# Patient Record
Sex: MALE | Race: BLACK OR AFRICAN AMERICAN | NOT HISPANIC OR LATINO | Employment: FULL TIME | URBAN - METROPOLITAN AREA
[De-identification: names, ages, dates, MRNs, and addresses within clinical notes are randomized per-mention and may not be internally consistent; named-entity substitution may affect disease eponyms.]

---

## 2020-07-18 ENCOUNTER — APPOINTMENT (OUTPATIENT)
Dept: GENERAL RADIOLOGY | Facility: CLINIC | Age: 25
End: 2020-07-18
Attending: EMERGENCY MEDICINE
Payer: COMMERCIAL

## 2020-07-18 ENCOUNTER — HOSPITAL ENCOUNTER (EMERGENCY)
Facility: CLINIC | Age: 25
Discharge: HOME | End: 2020-07-18
Attending: EMERGENCY MEDICINE | Admitting: EMERGENCY MEDICINE
Payer: COMMERCIAL

## 2020-07-18 VITALS
OXYGEN SATURATION: 99 % | RESPIRATION RATE: 21 BRPM | SYSTOLIC BLOOD PRESSURE: 111 MMHG | DIASTOLIC BLOOD PRESSURE: 61 MMHG | TEMPERATURE: 97 F | HEART RATE: 78 BPM | WEIGHT: 119 LBS

## 2020-07-18 DIAGNOSIS — G40.909 RECURRENT SEIZURES (H): ICD-10-CM

## 2020-07-18 LAB
ALBUMIN SERPL-MCNC: 4.3 G/DL (ref 3.4–5)
ALP SERPL-CCNC: 57 U/L (ref 40–150)
ALT SERPL W P-5'-P-CCNC: 25 U/L (ref 0–70)
ANION GAP SERPL CALCULATED.3IONS-SCNC: 8 MMOL/L (ref 3–14)
AST SERPL W P-5'-P-CCNC: 17 U/L (ref 0–45)
BASOPHILS # BLD AUTO: 0 10E9/L (ref 0–0.2)
BASOPHILS NFR BLD AUTO: 0.1 %
BILIRUB SERPL-MCNC: 0.6 MG/DL (ref 0.2–1.3)
BUN SERPL-MCNC: 5 MG/DL (ref 7–30)
CALCIUM SERPL-MCNC: 9.1 MG/DL (ref 8.5–10.1)
CHLORIDE SERPL-SCNC: 106 MMOL/L (ref 94–109)
CO2 SERPL-SCNC: 23 MMOL/L (ref 20–32)
CREAT SERPL-MCNC: 0.72 MG/DL (ref 0.66–1.25)
DIFFERENTIAL METHOD BLD: ABNORMAL
EOSINOPHIL # BLD AUTO: 0.1 10E9/L (ref 0–0.7)
EOSINOPHIL NFR BLD AUTO: 0.7 %
ERYTHROCYTE [DISTWIDTH] IN BLOOD BY AUTOMATED COUNT: 13.2 % (ref 10–15)
GFR SERPL CREATININE-BSD FRML MDRD: >90 ML/MIN/{1.73_M2}
GLUCOSE SERPL-MCNC: 94 MG/DL (ref 70–99)
HCT VFR BLD AUTO: 44.2 % (ref 40–53)
HGB BLD-MCNC: 15.5 G/DL (ref 13.3–17.7)
IMM GRANULOCYTES # BLD: 0 10E9/L (ref 0–0.4)
IMM GRANULOCYTES NFR BLD: 0.2 %
LYMPHOCYTES # BLD AUTO: 2.3 10E9/L (ref 0.8–5.3)
LYMPHOCYTES NFR BLD AUTO: 15.1 %
MAGNESIUM SERPL-MCNC: 2.8 MG/DL (ref 1.6–2.3)
MCH RBC QN AUTO: 30.5 PG (ref 26.5–33)
MCHC RBC AUTO-ENTMCNC: 35.1 G/DL (ref 31.5–36.5)
MCV RBC AUTO: 87 FL (ref 78–100)
MONOCYTES # BLD AUTO: 0.9 10E9/L (ref 0–1.3)
MONOCYTES NFR BLD AUTO: 6.3 %
NEUTROPHILS # BLD AUTO: 11.6 10E9/L (ref 1.6–8.3)
NEUTROPHILS NFR BLD AUTO: 77.6 %
NRBC # BLD AUTO: 0 10*3/UL
NRBC BLD AUTO-RTO: 0 /100
PLATELET # BLD AUTO: 291 10E9/L (ref 150–450)
POTASSIUM SERPL-SCNC: 3.6 MMOL/L (ref 3.4–5.3)
PROT SERPL-MCNC: 7.9 G/DL (ref 6.8–8.8)
RBC # BLD AUTO: 5.08 10E12/L (ref 4.4–5.9)
SODIUM SERPL-SCNC: 137 MMOL/L (ref 133–144)
TROPONIN I SERPL-MCNC: <0.015 UG/L (ref 0–0.04)
WBC # BLD AUTO: 14.9 10E9/L (ref 4–11)

## 2020-07-18 PROCEDURE — 80177 DRUG SCRN QUAN LEVETIRACETAM: CPT | Performed by: EMERGENCY MEDICINE

## 2020-07-18 PROCEDURE — 25000128 H RX IP 250 OP 636: Performed by: EMERGENCY MEDICINE

## 2020-07-18 PROCEDURE — 99285 EMERGENCY DEPT VISIT HI MDM: CPT | Mod: 25

## 2020-07-18 PROCEDURE — 96365 THER/PROPH/DIAG IV INF INIT: CPT

## 2020-07-18 PROCEDURE — 83735 ASSAY OF MAGNESIUM: CPT | Performed by: EMERGENCY MEDICINE

## 2020-07-18 PROCEDURE — 96375 TX/PRO/DX INJ NEW DRUG ADDON: CPT

## 2020-07-18 PROCEDURE — 93005 ELECTROCARDIOGRAM TRACING: CPT

## 2020-07-18 PROCEDURE — 85025 COMPLETE CBC W/AUTO DIFF WBC: CPT | Performed by: EMERGENCY MEDICINE

## 2020-07-18 PROCEDURE — 25000128 H RX IP 250 OP 636

## 2020-07-18 PROCEDURE — 25800030 ZZH RX IP 258 OP 636: Performed by: EMERGENCY MEDICINE

## 2020-07-18 PROCEDURE — 71046 X-RAY EXAM CHEST 2 VIEWS: CPT

## 2020-07-18 PROCEDURE — 80053 COMPREHEN METABOLIC PANEL: CPT | Performed by: EMERGENCY MEDICINE

## 2020-07-18 PROCEDURE — 84484 ASSAY OF TROPONIN QUANT: CPT | Performed by: EMERGENCY MEDICINE

## 2020-07-18 RX ORDER — LEVETIRACETAM 500 MG/1
1500 TABLET ORAL 2 TIMES DAILY
Qty: 180 TABLET | Refills: 0 | Status: SHIPPED | OUTPATIENT
Start: 2020-07-18 | End: 2020-10-06

## 2020-07-18 RX ORDER — MAGNESIUM SULFATE HEPTAHYDRATE 500 MG/ML
2 INJECTION, SOLUTION INTRAMUSCULAR; INTRAVENOUS ONCE
Status: DISCONTINUED | OUTPATIENT
Start: 2020-07-18 | End: 2020-07-18 | Stop reason: CLARIF

## 2020-07-18 RX ORDER — LORAZEPAM 2 MG/ML
INJECTION INTRAMUSCULAR
Status: COMPLETED
Start: 2020-07-18 | End: 2020-07-18

## 2020-07-18 RX ORDER — MAGNESIUM SULFATE HEPTAHYDRATE 40 MG/ML
2 INJECTION, SOLUTION INTRAVENOUS ONCE
Status: COMPLETED | OUTPATIENT
Start: 2020-07-18 | End: 2020-07-18

## 2020-07-18 RX ADMIN — SODIUM CHLORIDE 1000 ML: 9 INJECTION, SOLUTION INTRAVENOUS at 06:26

## 2020-07-18 RX ADMIN — LEVETIRACETAM 2000 MG: 100 INJECTION, SOLUTION INTRAVENOUS at 07:16

## 2020-07-18 RX ADMIN — MAGNESIUM SULFATE IN WATER 2 G: 40 INJECTION, SOLUTION INTRAVENOUS at 06:37

## 2020-07-18 RX ADMIN — LORAZEPAM 1 MG: 2 INJECTION INTRAMUSCULAR; INTRAVENOUS at 06:26

## 2020-07-18 ASSESSMENT — ENCOUNTER SYMPTOMS
SHORTNESS OF BREATH: 0
SEIZURES: 1
FEVER: 0
COUGH: 0
VOMITING: 0
DIARRHEA: 0

## 2020-07-18 NOTE — ED TRIAGE NOTES
Pt was brought from home due to increased seizures. Pt had seizures yesterday afternoon and another at the [address] [place] [address] where he was discharged home and told to follow up with neurology.     According to family, pt had a seizure 30 min after arrival and was unresponsive. EMT state that his brother gave him two chest compressions and he come back.     EMT:  Vitals within normal, . They placed 18 G IV in his right AC and gave  him 4 mg Zofran .

## 2020-07-18 NOTE — ED AVS SNAPSHOT
Emergency Department  [address] [place] [address]  Phone:  [phone]  Fax:  [phone]                                    Mr. Sissy Stewart   MRN: [id number]    Department:   Emergency Department   Date of Visit:  7/18/2020           After Visit Summary Signature Page    I have received my discharge instructions, and my questions have been answered. I have discussed any challenges I see with this plan with the nurse or doctor.    ..........................................................................................................................................  Patient/Patient Representative Signature      ..........................................................................................................................................  Patient Representative Print Name and Relationship to Patient    ..................................................               ................................................  Date                                   Time    ..........................................................................................................................................  Reviewed by Signature/Title    ...................................................              ..............................................  Date                                               Time          22EPIC Rev 08/18

## 2020-07-18 NOTE — DISCHARGE INSTRUCTIONS
Do not drive a car until you have been seizure free for 3 months and have gotten cleared by your Neurologist

## 2020-07-18 NOTE — ED PROVIDER NOTES
History     Chief Complaint:  Seizures      HPI   Sissy Stewart is a 24 year old male, with a history of seizures, the last one occurring 2-3 months prior, who presents with via EMS after having a seizure. The patient reports he had one seizure around 1700, and a second around 1800 last night. The patient states he had a third seizure this morning and was unresponsive, he awoke to EMT's on scene. The patient states he missed his Keppra dose last night and this morning.  The patient states when he has seizures he knows they are coming as he develops an aura. The paramedics administered Zofran. The patient denies any Covid exposures and symptoms, the patient denies vomiting or diarrhea.  He denies headache or neck pain/stifness.  He denies fever/chills, cough/cold symptoms, chest pain, SOB, sore throat.    Allergies:  No known drug allergies     Medications:    Keppra    Past Medical History:    Seizures    Past Surgical History:    History reviewed. No pertinent surgical history.    Family History:    History reviewed. No pertinent family history.     Social History:  Not on file.       Review of Systems   Constitutional: Negative for fever.   Respiratory: Negative for cough and shortness of breath.    Cardiovascular: Negative for chest pain.   Gastrointestinal: Negative for diarrhea and vomiting.   Neurological: Positive for seizures.   All other systems reviewed and are negative.    Physical Exam     Patient Vitals for the past 24 hrs:   BP Temp Temp src Pulse Heart Rate Resp SpO2 Weight   07/18/20 0830 111/61 -- -- 78 79 21 99 % --   07/18/20 0705 131/73 -- -- 63 63 14 99 % --   07/18/20 0627 -- -- -- -- -- -- -- 54 kg (119 lb)   07/18/20 0620 -- -- -- -- 68 19 99 % --   07/18/20 0610 (!) 148/54 -- -- -- 70 18 98 % --   07/18/20 0601 -- 97  F (36.1  C) Temporal -- -- -- -- --   07/18/20 0557 130/67 -- -- 101 101 19 99 % --       Physical Exam  Nursing note and vitals reviewed.  Constitutional:  Appears  well-developed and well-nourished.   HENT:   Head:    Atraumatic.   Mouth/Throat:   Oropharynx is clear and moist. No oropharyngeal exudate.   Eyes:    Pupils are equal, round, and reactive to light.   Neck:    Normal range of motion. Neck supple.      No tracheal deviation present. No thyromegaly present.   Cardiovascular:  Normal rate, regular rhythm, no murmur   Pulmonary/Chest: Breath sounds are clear and equal without wheezes or crackles. Non tender chest wall.  Abdominal:   Soft. Bowel sounds are normal. Exhibits no distension and      no mass. There is no tenderness.      There is no rebound and no guarding.   Musculoskeletal:  Exhibits no edema.   Lymphadenopathy:  No cervical adenopathy.   Neurological:   Alert and oriented to person, place, and time. GCS 15.  CN 2-12 intact.  and proximal upper extremity strength strong and equal.  Bilateral lower extremity strength strong and equal, including strong dorsiflexion and plantarflexion strength.  Sensation intact and equal to the face, arms and legs.  No facial droop or weakness. Normal speech.  Follows commands and answers questions normally.    Skin:    Skin is warm and dry. No rash noted. No pallor.     Emergency Department Course   ECG (06:19:13):  Rate 73 bpm. WY interval 134. QRS duration 94. QT/QTc 388/427. P-R-T axes 50 56 39. Normal sinus rhythm with sinus arrhythmia. Minimal voltage criteria for LVH, may be normal variant. Borderline ECG. Interpreted at 0717 by Jenelle Galan MD.    Imaging:  Radiology findings were communicated with the patient who voiced understanding of the findings.    Chest XR: PA & LAT  No focal air-space disease or other acute findings. No pneumothorax.     Laboratory:  Laboratory findings were communicated with the patient who voiced understanding of the findings.    CBC: WBC 14.9 (H) o/w WNL. (HGB 15.5, )     CMP: BUN 5 (L) WNL (Creatinine: 0.72)    Troponin (Collected 0608): <0.015     Magnesium: 2.8  (H)    Keppra Level: In Process    Interventions:  0626 Ativan 2 mg IV  0633 Magnesium sulfate 2 g IV  0704 NS 1L IV Bolus  0716 Keppra 2000 mg IV    Emergency Department Course:  Past medical records, nursing notes, and vitals reviewed.    0607 I performed an exam of the patient as documented above.     EKG obtained in the ED, see results above.   IV was inserted and blood was drawn for laboratory testing, results above.  The patient was sent for a Chest XR while in the emergency department, results above.     0830 I rechecked the patient and discussed the results of his workup thus far.     Findings and plan explained to the Patient. Patient discharged home with instructions regarding supportive care, medications, and reasons to return. The importance of close follow-up was reviewed.     I personally reviewed the laboratory  and imaging results with the Patient and answered all related questions prior to discharge.     Impression & Plan     Medical Decision Making:  Sissy Stewart, I felt this patient to have recurrent seizures, the seizure today is likely due to the fact that he has missed two doses of his Keppra last night and this morning. He was IV loaded with Keppra and was feeling improved here. He has not had any seizure activity while here. There was no sign of meningitis, encephalitis or any other concerning problem. I did not have concern for intercranial bleed or brain tumor. He has been feeling improved. He had CPR compressions but no sign of pneumothorax or rib fracture on his chest XR. He does not have any chest pain so I did not have concern for any cardiac injury. His EKG is normal. He was told to follow up with his neurologist on Monday. He was told not to drive a car until he has been seizure free for three months, and cleared by neurology.     Diagnosis:    ICD-10-CM    1. Recurrent seizures (H)  G40.909        Disposition:  Discharged to home.    Discharge Medications:  New Prescriptions     LEVETIRACETAM (KEPPRA) 500 MG TABLET    Take 3 tablets (1,500 mg) by mouth 2 times daily       Scribe Disclosure:  I, Chidi Aranda, am serving as a scribe at 6:07 AM on 7/18/2020 to document services personally performed by Jenelle Galan MD based on my observations and the provider's statements to me.        Jenelle Galan MD  07/18/20 9309

## 2020-07-18 NOTE — ED NOTES
Bed: ED05  Expected date:   Expected time:   Means of arrival:   Comments:  516 24m seizure , eta 12

## 2020-07-19 LAB — LEVETIRACETAM SERPL-MCNC: 20 UG/ML (ref 12–46)

## 2020-07-20 LAB — INTERPRETATION ECG - MUSE: NORMAL

## 2020-08-17 ENCOUNTER — HOSPITAL ENCOUNTER (EMERGENCY)
Facility: CLINIC | Age: 25
Discharge: HOME | End: 2020-08-17
Attending: EMERGENCY MEDICINE | Admitting: EMERGENCY MEDICINE
Payer: COMMERCIAL

## 2020-08-17 VITALS
RESPIRATION RATE: 16 BRPM | WEIGHT: 120 LBS | DIASTOLIC BLOOD PRESSURE: 70 MMHG | HEART RATE: 71 BPM | TEMPERATURE: 99.3 F | SYSTOLIC BLOOD PRESSURE: 125 MMHG | OXYGEN SATURATION: 100 % | HEIGHT: 69 IN | BODY MASS INDEX: 17.77 KG/M2

## 2020-08-17 DIAGNOSIS — Z20.822 SUSPECTED COVID-19 VIRUS INFECTION: Primary | ICD-10-CM

## 2020-08-17 DIAGNOSIS — R56.9 SEIZURE (H): ICD-10-CM

## 2020-08-17 LAB
ANION GAP SERPL CALCULATED.3IONS-SCNC: 8 MMOL/L (ref 3–14)
BASOPHILS # BLD AUTO: 0 10E9/L (ref 0–0.2)
BASOPHILS NFR BLD AUTO: 0.2 %
BUN SERPL-MCNC: 7 MG/DL (ref 7–30)
CALCIUM SERPL-MCNC: 8.7 MG/DL (ref 8.5–10.1)
CHLORIDE SERPL-SCNC: 104 MMOL/L (ref 94–109)
CO2 SERPL-SCNC: 23 MMOL/L (ref 20–32)
CREAT SERPL-MCNC: 0.81 MG/DL (ref 0.66–1.25)
DIFFERENTIAL METHOD BLD: ABNORMAL
EOSINOPHIL # BLD AUTO: 0.7 10E9/L (ref 0–0.7)
EOSINOPHIL NFR BLD AUTO: 5.7 %
ERYTHROCYTE [DISTWIDTH] IN BLOOD BY AUTOMATED COUNT: 13.4 % (ref 10–15)
ETHANOL SERPL-MCNC: <0.01 G/DL
GFR SERPL CREATININE-BSD FRML MDRD: >90 ML/MIN/{1.73_M2}
GLUCOSE SERPL-MCNC: 90 MG/DL (ref 70–99)
HCT VFR BLD AUTO: 41.7 % (ref 40–53)
HGB BLD-MCNC: 14.5 G/DL (ref 13.3–17.7)
IMM GRANULOCYTES # BLD: 0 10E9/L (ref 0–0.4)
IMM GRANULOCYTES NFR BLD: 0.3 %
LYMPHOCYTES # BLD AUTO: 3 10E9/L (ref 0.8–5.3)
LYMPHOCYTES NFR BLD AUTO: 24.3 %
MCH RBC QN AUTO: 30.3 PG (ref 26.5–33)
MCHC RBC AUTO-ENTMCNC: 34.8 G/DL (ref 31.5–36.5)
MCV RBC AUTO: 87 FL (ref 78–100)
MONOCYTES # BLD AUTO: 0.9 10E9/L (ref 0–1.3)
MONOCYTES NFR BLD AUTO: 6.9 %
NEUTROPHILS # BLD AUTO: 7.8 10E9/L (ref 1.6–8.3)
NEUTROPHILS NFR BLD AUTO: 62.6 %
NRBC # BLD AUTO: 0 10*3/UL
NRBC BLD AUTO-RTO: 0 /100
PLATELET # BLD AUTO: 274 10E9/L (ref 150–450)
POTASSIUM SERPL-SCNC: 3.6 MMOL/L (ref 3.4–5.3)
RBC # BLD AUTO: 4.79 10E12/L (ref 4.4–5.9)
SARS-COV-2 RNA SPEC QL NAA+PROBE: NOT DETECTED
SODIUM SERPL-SCNC: 135 MMOL/L (ref 133–144)
SPECIMEN SOURCE: NORMAL
WBC # BLD AUTO: 12.4 10E9/L (ref 4–11)

## 2020-08-17 PROCEDURE — 80048 BASIC METABOLIC PNL TOTAL CA: CPT | Performed by: EMERGENCY MEDICINE

## 2020-08-17 PROCEDURE — 80177 DRUG SCRN QUAN LEVETIRACETAM: CPT | Performed by: EMERGENCY MEDICINE

## 2020-08-17 PROCEDURE — 85025 COMPLETE CBC W/AUTO DIFF WBC: CPT | Performed by: EMERGENCY MEDICINE

## 2020-08-17 PROCEDURE — 99283 EMERGENCY DEPT VISIT LOW MDM: CPT

## 2020-08-17 PROCEDURE — C9803 HOPD COVID-19 SPEC COLLECT: HCPCS

## 2020-08-17 PROCEDURE — U0003 INFECTIOUS AGENT DETECTION BY NUCLEIC ACID (DNA OR RNA); SEVERE ACUTE RESPIRATORY SYNDROME CORONAVIRUS 2 (SARS-COV-2) (CORONAVIRUS DISEASE [COVID-19]), AMPLIFIED PROBE TECHNIQUE, MAKING USE OF HIGH THROUGHPUT TECHNOLOGIES AS DESCRIBED BY CMS-2020-01-R: HCPCS | Performed by: EMERGENCY MEDICINE

## 2020-08-17 PROCEDURE — 80320 DRUG SCREEN QUANTALCOHOLS: CPT | Performed by: EMERGENCY MEDICINE

## 2020-08-17 SDOH — HEALTH STABILITY: MENTAL HEALTH: HOW OFTEN DO YOU HAVE A DRINK CONTAINING ALCOHOL?: NEVER

## 2020-08-17 ASSESSMENT — ENCOUNTER SYMPTOMS: SEIZURES: 1

## 2020-08-17 ASSESSMENT — MIFFLIN-ST. JEOR: SCORE: 1482.07

## 2020-08-17 NOTE — ED NOTES
Seen patient only at discharge.  Patient states all questions were answered by MD.  Ambulatory with stable gait.  Patient escorted to brother who will be driving him home.

## 2020-08-17 NOTE — ED TRIAGE NOTES
Pt with hx of epilepsy. He had a seizure tonight per family. EMS was called, pt was not seizing on arrival of EMD but they report that he was postictal.

## 2020-08-17 NOTE — ED PROVIDER NOTES
History     Chief Complaint:  Seizures    The history is provided by the patient and the EMS personnel.      Sissy Stewart is a 24 year old male, with history of epilepsy who presents via EMS from home for evaluation of a witnessed seizure by family. Patient had dinner with his family last night and was at baseline. He went to bed when he had a seizure, while in bed, lasting a few minutes at most. Patient experienced urinary incontinence and biting of the tongue, prompting EMS to be called.     On EMS arrival, patient was postictal and did not require medications. There was no other trauma noted.     Here, patient is back at baseline. He states he is still on Keppra (1000 mg) and has been compliant with this medication. He is followed by neurology, Dr. Pruitt, at [facility]. Patient denies any recent alcohol use.     MR Brain w/o & W Contrast fro m7/31/2020:  IMPRESSION  Impression:  Normal brain MRI without and with contrast. No seizure focus identified.    Per Chart Review  Patient was initially started on 1000 mg Keppra twice daily starting at the beginning of 2020. Between starting Keppra at that time to being evaluated in the ED on 7/18 for seizures, his dose was increased to 1500 mg Twice daily. Keppra Level was checked during his ED visit and was 20. His Keppra was rechecked on 7/21 and was reported to 39.8. During this recheck, he reported experiencing side effects from the Keppra, thus his dosage was decreased to 1250 mg twice daily.     Allergies:  No Known Drug Allergies     Medications:    Keppra    Past Medical History:    Epilepsy    Past Surgical History:    ORIF Humerus proximal - left    Family History:    History reviewed. No pertinent family history.       Social History:  The patient was accompanied to the ED by EMS.  Smoking Status: Never  Smokeless Tobacco: Never  Alcohol Use: Yes - 15 cans of beer + 8 shots of liquor/week  Marital Status:  Single [1]  "    Review of Systems   HENT:        Abrasion to tongue   Genitourinary:        Urinary incontinence   Neurological: Positive for seizures.   All other systems reviewed and are negative.    Physical Exam     Patient Vitals for the past 24 hrs:   BP Temp Temp src Pulse Heart Rate Resp Height Weight   08/17/20 0209 -- -- -- -- -- -- 1.753 m (5' 9\") --   08/17/20 0206 120/74 99.3  F (37.4  C) Oral 94 94 16 1.676 m (5' 6\") 54.4 kg (120 lb)       Physical Exam  Physical Exam   General:  Sitting on bed alone.   HENT:  No obvious trauma to head. Small abrasion to right tongue.   Right Ear:  External ear normal.   Left Ear:  External ear normal.   Nose:  Nose normal.   Eyes:  Conjunctivae and EOM are normal. Pupils are equal, round, and reactive.   Neck: Normal range of motion. Neck supple. No tracheal deviation present.   CV:  Normal heart sounds. No murmur heard.  Pulm/Chest: Effort normal and breath sounds normal.   Abd: Soft. No distension. There is no tenderness. There is no rigidity, no rebound and no guarding.   M/S: Normal range of motion.   Neuro: Alert. GCS 15. CN II-XII grossly intact. No meningeal signs.  Skin: Skin is warm and dry. No rash noted. Not diaphoretic.   Psych: Normal mood and affect. Behavior is normal.      Emergency Department Course     Laboratory:  Laboratory findings were communicated with the patient who voiced understanding of the findings.    CBC: WBC 12.4 (H) o/w WNL (HGB 14.5, )  BMP: AWNL (Creatinine 0.81)  Keppra level: Pending  Alcohol ethyl: <0.01    Symptomatic COVID-19 (Coronavirus) PCR by Nasopharyngeal Swab: Pending      Emergency Department Course:  Past medical records, nursing notes, and vitals reviewed.    (0211)   I performed an exam of the patient as documented above. History obtained from patient.    IV was inserted and blood was drawn for laboratory testing, results above.    A nasal swab was obtained for laboratory testing, findings above.      (0307)   I rechecked " the patient and discussed the results of his workup thus far.    (4010)   I spoke with Dr. Schumacher of the Neurology service from [facility] egarding patient's presentation, findings, and plan of care.      (1917)   I spoke with Dr. Schumacher of the Neurology service regarding patient's presentation, findings, and plan of care.  Patient already has an appointment scheduled for 8/21 at 0900.     (0352)   Rechecked patient and discussed results and plan of care. Patient will be discharged.     Findings and plan explained to the Patient. Patient discharged home with instructions regarding supportive care, medications, and reasons to return. The importance of close follow-up was reviewed.    I personally reviewed the laboratory results with the Patient and answered all related questions prior to discharge.     Impression & Plan     Medical Decision Making:  Sissy Stewart is a very pleasant 24 year old male with a PMH significant for a seizure disorder who presents for evaluation of a spell consistent with a seizure witnessed by family as described in the HPI above while he was in bed. Of note, patient had a similar episode on 7/18 and was evaluated at that time. His Keppra dose was then adjusted from 1000 BID to 1500 mg BID. His level when on the 1000 bid dose was 20. At the time of the 7/18 sz he was increased to 1500 BID and level on this dose was 39.8. he did not like the side effects at this dose, so it was decreased to 1250 mg BID.    Today, a broad differential diagnosis was considered for the seizure today including medicine non-compliance, low or high levels of anti-epileptic, intracranial bleed, stroke, tumor, hypoglycemia, cerebral edema, metabolic derangement, cardiac arrhythmia, etc.  The patient has no signs of concerning etiologies of seizure or seizure-mimics at this time.  Anti-epileptic levels were sent and they will discuss the level with the neurologist. I contacted neurology,   Endy, who recommended patient increase his dosage back up to 1500 mg twice daily from 1250 mg twice daily. I reviewed this with him and he agrees. He has a scheduled appointment with his neurologist on 8/21 at 9:00AM and is aware of this. The head to toe trauma exam is negative except for an abrasion to the right side of the tongue; there are no signs of serious sequelae of trauma at this time such as spinal fractures, dislocations, etc. Supportive outpatient management is indicated. He is not driving and has a ride home. Of note, the patient had a low grade temperature and headache so a COVID swab was obtained, but he has no chest pain or shortness of breath.    The treatment plan was discussed with the patient and they expressed understanding of this plan and consented to the plan.  In addition, the patient will return to the emergency department if their symptoms persist, worsen, if new symptoms arise or if there is any concern as other pathology may be present that is not evident at this time. They also understand the importance of close follow up in the clinic and if unable to do so will return to the emergency department for a reevaluation. All questions were answered.    Covid-19  Sissy Stewart was evaluated during a global COVID-19 pandemic, which necessitated consideration that the patient might be at risk for infection with the SARS-CoV-2 virus that causes COVID-19.   Applicable protocols for evaluation were followed during the patient's care.   COVID-19 was considered as part of the patient's evaluation. The plan for testing is:  a test was obtained during this visit.    Diagnosis:    ICD-10-CM    1. Seizure (H)  R56.9        Disposition:  Discharged to home.    Scribe Disclosure:  KAVITHA, Jacqueline Durham, am serving as a scribe at 2:09 AM on 8/17/2020 to document services personally performed by Mark Kidd DO based on my observations and the provider's statements to me.   8/17/2020   [facility]  EMERGENCY DEPARTMENT       Mark Kidd,   08/17/20 9870

## 2020-08-17 NOTE — ED AVS SNAPSHOT
Emergency Department  [address] [place] [address]  Phone:  [phone]  Fax:  [phone]                                    Mr. Sissy Stewart   MRN: [id number]    Department:   Emergency Department   Date of Visit:  8/17/2020           After Visit Summary Signature Page    I have received my discharge instructions, and my questions have been answered. I have discussed any challenges I see with this plan with the nurse or doctor.    ..........................................................................................................................................  Patient/Patient Representative Signature      ..........................................................................................................................................  Patient Representative Print Name and Relationship to Patient    ..................................................               ................................................  Date                                   Time    ..........................................................................................................................................  Reviewed by Signature/Title    ...................................................              ..............................................  Date                                               Time          22EPIC Rev 08/18

## 2020-08-17 NOTE — ED NOTES
Bed: ED06  Expected date:   Expected time:   Means of arrival:   Comments:  422  24M Seizure  Covid symptoms (cough, fever)  0151

## 2020-08-18 ENCOUNTER — PATIENT OUTREACH (OUTPATIENT)
Dept: CARE COORDINATION | Facility: CLINIC | Age: 25
End: 2020-08-18

## 2020-08-18 LAB — LEVETIRACETAM SERPL-MCNC: 26 UG/ML (ref 12–46)

## 2020-08-18 NOTE — PROGRESS NOTES
Clinic Care Coordination Contact  [facility]/Voicemail       Clinical Data: Care Coordinator Outreach  Outreach attempted x 1.  Left message on patient's voicemail with call back information and requested return call.  Plan: Care Coordinator will try to reach patient again in 1-2 business days.    Evy Meredith RN  [facility] Primary [facility]-Care Coordination  [facility]  [facility]  [phone]

## 2020-08-20 NOTE — PROGRESS NOTES
Clinic Care Coordination Contact  Care Team Conversations    Received notification of ED visit with COVID -19 testing done and no PCP listed for follow up care. Referral for care coordination services entered to outreach to patient. Reviewed with patient. He is feeling better. He does not have a primary care provider and follow with neurology provider due to seizures. He would like to establish care with a primary care provider and is transferred to [facility] Central Scheduling line to schedule an appointment. No further concerns per patient.     Evy Meredith RN  [facility] Primary Care-Care Coordination  [facility]  [facility]  [phone]

## 2020-10-06 ENCOUNTER — APPOINTMENT (OUTPATIENT)
Dept: GENERAL RADIOLOGY | Facility: CLINIC | Age: 25
End: 2020-10-06
Attending: EMERGENCY MEDICINE
Payer: COMMERCIAL

## 2020-10-06 ENCOUNTER — HOSPITAL ENCOUNTER (INPATIENT)
Facility: CLINIC | Age: 25
LOS: 1 days | Discharge: HOME | End: 2020-10-07
Attending: EMERGENCY MEDICINE | Admitting: INTERNAL MEDICINE
Payer: COMMERCIAL

## 2020-10-06 DIAGNOSIS — G40.909 RECURRENT SEIZURES (H): ICD-10-CM

## 2020-10-06 LAB
ANION GAP SERPL CALCULATED.3IONS-SCNC: 5 MMOL/L (ref 3–14)
BASOPHILS # BLD AUTO: 0 10E9/L (ref 0–0.2)
BASOPHILS NFR BLD AUTO: 0.6 %
BUN SERPL-MCNC: 11 MG/DL (ref 7–30)
CALCIUM SERPL-MCNC: 8.6 MG/DL (ref 8.5–10.1)
CHLORIDE SERPL-SCNC: 110 MMOL/L (ref 94–109)
CO2 SERPL-SCNC: 23 MMOL/L (ref 20–32)
CREAT SERPL-MCNC: 0.9 MG/DL (ref 0.66–1.25)
DIFFERENTIAL METHOD BLD: NORMAL
EOSINOPHIL # BLD AUTO: 0.7 10E9/L (ref 0–0.7)
EOSINOPHIL NFR BLD AUTO: 11.1 %
ERYTHROCYTE [DISTWIDTH] IN BLOOD BY AUTOMATED COUNT: 12.7 % (ref 10–15)
GFR SERPL CREATININE-BSD FRML MDRD: >90 ML/MIN/{1.73_M2}
GLUCOSE SERPL-MCNC: 82 MG/DL (ref 70–99)
HCT VFR BLD AUTO: 44.6 % (ref 40–53)
HGB BLD-MCNC: 15.7 G/DL (ref 13.3–17.7)
IMM GRANULOCYTES # BLD: 0 10E9/L (ref 0–0.4)
IMM GRANULOCYTES NFR BLD: 0.2 %
LYMPHOCYTES # BLD AUTO: 2.4 10E9/L (ref 0.8–5.3)
LYMPHOCYTES NFR BLD AUTO: 38.4 %
MCH RBC QN AUTO: 30.6 PG (ref 26.5–33)
MCHC RBC AUTO-ENTMCNC: 35.2 G/DL (ref 31.5–36.5)
MCV RBC AUTO: 87 FL (ref 78–100)
MONOCYTES # BLD AUTO: 0.4 10E9/L (ref 0–1.3)
MONOCYTES NFR BLD AUTO: 6 %
NEUTROPHILS # BLD AUTO: 2.8 10E9/L (ref 1.6–8.3)
NEUTROPHILS NFR BLD AUTO: 43.7 %
NRBC # BLD AUTO: 0 10*3/UL
NRBC BLD AUTO-RTO: 0 /100
PLATELET # BLD AUTO: 322 10E9/L (ref 150–450)
POTASSIUM SERPL-SCNC: 3.3 MMOL/L (ref 3.4–5.3)
POTASSIUM SERPL-SCNC: 3.9 MMOL/L (ref 3.4–5.3)
RBC # BLD AUTO: 5.13 10E12/L (ref 4.4–5.9)
SARS-COV-2 RNA SPEC QL NAA+PROBE: NOT DETECTED
SODIUM SERPL-SCNC: 138 MMOL/L (ref 133–144)
SPECIMEN SOURCE: NORMAL
WBC # BLD AUTO: 6.3 10E9/L (ref 4–11)

## 2020-10-06 PROCEDURE — 73030 X-RAY EXAM OF SHOULDER: CPT | Mod: RT

## 2020-10-06 PROCEDURE — 80048 BASIC METABOLIC PNL TOTAL CA: CPT | Performed by: EMERGENCY MEDICINE

## 2020-10-06 PROCEDURE — 250N000013 HC RX MED GY IP 250 OP 250 PS 637: Performed by: EMERGENCY MEDICINE

## 2020-10-06 PROCEDURE — 99223 1ST HOSP IP/OBS HIGH 75: CPT | Performed by: INTERNAL MEDICINE

## 2020-10-06 PROCEDURE — 85025 COMPLETE CBC W/AUTO DIFF WBC: CPT | Performed by: EMERGENCY MEDICINE

## 2020-10-06 PROCEDURE — C9803 HOPD COVID-19 SPEC COLLECT: HCPCS

## 2020-10-06 PROCEDURE — 99285 EMERGENCY DEPT VISIT HI MDM: CPT | Mod: 25

## 2020-10-06 PROCEDURE — 96374 THER/PROPH/DIAG INJ IV PUSH: CPT

## 2020-10-06 PROCEDURE — 250N000011 HC RX IP 250 OP 636

## 2020-10-06 PROCEDURE — 80201 ASSAY OF TOPIRAMATE: CPT | Performed by: EMERGENCY MEDICINE

## 2020-10-06 PROCEDURE — U0003 INFECTIOUS AGENT DETECTION BY NUCLEIC ACID (DNA OR RNA); SEVERE ACUTE RESPIRATORY SYNDROME CORONAVIRUS 2 (SARS-COV-2) (CORONAVIRUS DISEASE [COVID-19]), AMPLIFIED PROBE TECHNIQUE, MAKING USE OF HIGH THROUGHPUT TECHNOLOGIES AS DESCRIBED BY CMS-2020-01-R: HCPCS | Performed by: EMERGENCY MEDICINE

## 2020-10-06 PROCEDURE — 36415 COLL VENOUS BLD VENIPUNCTURE: CPT | Performed by: INTERNAL MEDICINE

## 2020-10-06 PROCEDURE — 250N000013 HC RX MED GY IP 250 OP 250 PS 637: Performed by: INTERNAL MEDICINE

## 2020-10-06 PROCEDURE — 84132 ASSAY OF SERUM POTASSIUM: CPT | Performed by: INTERNAL MEDICINE

## 2020-10-06 PROCEDURE — 99207 PR CDG-MDM COMPONENT: MEETS HIGH - UP CODED: CPT | Performed by: INTERNAL MEDICINE

## 2020-10-06 PROCEDURE — 120N000001 HC R&B MED SURG/OB

## 2020-10-06 RX ORDER — MAGNESIUM SULFATE HEPTAHYDRATE 40 MG/ML
4 INJECTION, SOLUTION INTRAVENOUS EVERY 4 HOURS PRN
Status: DISCONTINUED | OUTPATIENT
Start: 2020-10-06 | End: 2020-10-07 | Stop reason: HOSPADM

## 2020-10-06 RX ORDER — ACETAMINOPHEN 325 MG/1
650 TABLET ORAL EVERY 4 HOURS PRN
Status: DISCONTINUED | OUTPATIENT
Start: 2020-10-06 | End: 2020-10-07 | Stop reason: HOSPADM

## 2020-10-06 RX ORDER — POTASSIUM CHLORIDE 1.5 G/1.58G
20-40 POWDER, FOR SOLUTION ORAL
Status: DISCONTINUED | OUTPATIENT
Start: 2020-10-06 | End: 2020-10-07 | Stop reason: HOSPADM

## 2020-10-06 RX ORDER — NALOXONE HYDROCHLORIDE 0.4 MG/ML
.1-.4 INJECTION, SOLUTION INTRAMUSCULAR; INTRAVENOUS; SUBCUTANEOUS
Status: DISCONTINUED | OUTPATIENT
Start: 2020-10-06 | End: 2020-10-07 | Stop reason: HOSPADM

## 2020-10-06 RX ORDER — LORAZEPAM 2 MG/ML
1-2 INJECTION INTRAMUSCULAR
Status: DISCONTINUED | OUTPATIENT
Start: 2020-10-06 | End: 2020-10-07 | Stop reason: HOSPADM

## 2020-10-06 RX ORDER — LORAZEPAM 2 MG/ML
2 INJECTION INTRAMUSCULAR ONCE
Status: COMPLETED | OUTPATIENT
Start: 2020-10-06 | End: 2020-10-06

## 2020-10-06 RX ORDER — POLYETHYLENE GLYCOL 3350 17 G/17G
17 POWDER, FOR SOLUTION ORAL DAILY PRN
Status: DISCONTINUED | OUTPATIENT
Start: 2020-10-06 | End: 2020-10-07 | Stop reason: HOSPADM

## 2020-10-06 RX ORDER — TOPIRAMATE 100 MG/1
TABLET, FILM COATED ORAL 2 TIMES DAILY
Status: ON HOLD | COMMUNITY
End: 2020-10-07

## 2020-10-06 RX ORDER — TOPIRAMATE 50 MG/1
150 TABLET, FILM COATED ORAL 2 TIMES DAILY
Status: DISCONTINUED | OUTPATIENT
Start: 2020-10-06 | End: 2020-10-07 | Stop reason: HOSPADM

## 2020-10-06 RX ORDER — POTASSIUM CHLORIDE 29.8 MG/ML
20 INJECTION INTRAVENOUS
Status: DISCONTINUED | OUTPATIENT
Start: 2020-10-06 | End: 2020-10-07 | Stop reason: HOSPADM

## 2020-10-06 RX ORDER — POTASSIUM CHLORIDE 1500 MG/1
20-40 TABLET, EXTENDED RELEASE ORAL
Status: DISCONTINUED | OUTPATIENT
Start: 2020-10-06 | End: 2020-10-07 | Stop reason: HOSPADM

## 2020-10-06 RX ORDER — POTASSIUM CHLORIDE 7.45 MG/ML
10 INJECTION INTRAVENOUS
Status: DISCONTINUED | OUTPATIENT
Start: 2020-10-06 | End: 2020-10-07 | Stop reason: HOSPADM

## 2020-10-06 RX ORDER — LIDOCAINE 40 MG/G
CREAM TOPICAL
Status: DISCONTINUED | OUTPATIENT
Start: 2020-10-06 | End: 2020-10-07 | Stop reason: HOSPADM

## 2020-10-06 RX ORDER — LORAZEPAM 2 MG/ML
INJECTION INTRAMUSCULAR
Status: COMPLETED
Start: 2020-10-06 | End: 2020-10-06

## 2020-10-06 RX ORDER — ONDANSETRON 4 MG/1
4 TABLET, ORALLY DISINTEGRATING ORAL EVERY 6 HOURS PRN
Status: DISCONTINUED | OUTPATIENT
Start: 2020-10-06 | End: 2020-10-07 | Stop reason: HOSPADM

## 2020-10-06 RX ORDER — POTASSIUM CL/LIDO/0.9 % NACL 10MEQ/0.1L
10 INTRAVENOUS SOLUTION, PIGGYBACK (ML) INTRAVENOUS
Status: DISCONTINUED | OUTPATIENT
Start: 2020-10-06 | End: 2020-10-07 | Stop reason: HOSPADM

## 2020-10-06 RX ORDER — ONDANSETRON 2 MG/ML
4 INJECTION INTRAMUSCULAR; INTRAVENOUS EVERY 6 HOURS PRN
Status: DISCONTINUED | OUTPATIENT
Start: 2020-10-06 | End: 2020-10-07 | Stop reason: HOSPADM

## 2020-10-06 RX ADMIN — POTASSIUM CHLORIDE 20 MEQ: 1500 TABLET, EXTENDED RELEASE ORAL at 15:07

## 2020-10-06 RX ADMIN — POTASSIUM CHLORIDE 40 MEQ: 1500 TABLET, EXTENDED RELEASE ORAL at 13:01

## 2020-10-06 RX ADMIN — TOPIRAMATE 150 MG: 50 TABLET, FILM COATED ORAL at 19:21

## 2020-10-06 RX ADMIN — LORAZEPAM 2 MG: 2 INJECTION INTRAMUSCULAR; INTRAVENOUS at 06:43

## 2020-10-06 RX ADMIN — LORAZEPAM 2 MG: 2 INJECTION INTRAMUSCULAR at 06:43

## 2020-10-06 RX ADMIN — TOPIRAMATE 150 MG: 100 TABLET, FILM COATED ORAL at 07:34

## 2020-10-06 ASSESSMENT — ACTIVITIES OF DAILY LIVING (ADL)
ADLS_ACUITY_SCORE: 15

## 2020-10-06 ASSESSMENT — ENCOUNTER SYMPTOMS
FEVER: 0
SEIZURES: 1
COUGH: 0
VOMITING: 0

## 2020-10-06 ASSESSMENT — MIFFLIN-ST. JEOR: SCORE: 1536.5

## 2020-10-06 NOTE — ED NOTES
Pt was able to put on call light, and this writer entered room within 4 seconds. pt was in sitting position, back twisted, head turned 90 degrees, whole body rigid and started seizing. MD called into room immediately along with 2 RNs. Pt was able to be placed on his side, suction started and supplemental oxygen. Pt was in a seizure state on his side for over a minute. Due to staff being in room for duration of seizure, the incident occurred without injury. Airway maintained throughout. After about 60 seconds of shaking pt was screaming while still in a seizure state. Once pt was in postictal state he was placed on side, seizure pads placed on both sides.

## 2020-10-06 NOTE — PROGRESS NOTES
Patient called writer to room, patient sitting up holding head with occasional twitching of bilateral upper extremities. When asked patient if he was having aura before a seizure he said yes. Turned patient on his side in bed and sat with him and symptoms passed. Episode lasted 2 minutes, no seizure.

## 2020-10-06 NOTE — PROGRESS NOTES
Patient called and had episode of head feeling funny and chest heavy, scared he was going to have another seizure. Nurse assured patient we were here and would take care of him if he had another seizure and we would protect him from getting hurt. Patient did not have any seizure activity noted. sats 96 percent room air and pulse 72.

## 2020-10-06 NOTE — ED TRIAGE NOTES
Hx of seizure disorder.  States he had one last night.  Went to [facility] and wasn't seen right away so left and came here

## 2020-10-06 NOTE — PLAN OF CARE
RECEIVING UNIT ED HANDOFF REVIEW    ED Nurse Handoff Report was reviewed by: Alejandra Darling RN on October 6, 2020 at 8:50 AM

## 2020-10-06 NOTE — ED PROVIDER NOTES
"  History     Chief Complaint:  Seizures      HPI   Sissy Stewart is a 24 year old male with a history of epilepsia who presents for the evaluation of seizures. The patient reports that at approximately 0230 this evening, he experienced a seizure, prompting him to call 911 and be brought to [facility]. The patient states that he was just brought to the waiting room at [facility] so he presented here to [place]. He states that his most recent seizure to today was two months ago. He states that he has been taking his medications faithfully and that the few days prior to his seizure he was feeling his regular self. He also notes that he recently stopped taking his Keppra after slowly weaning off it. Patient endorses right shoulder pain from falling with his seizure. The patient denies cough, fever, vomiting, and other issues.      Allergies:  No known drug allergies      Medications:    Keppra   Topamax    Past Medical History:    Epilepsia      Past Surgical History:    History reviewed. No pertinent surgical history.     Family History:    History reviewed. No pertinent family history.     Social History:  Smoking status: Current every day smoker  Alcohol use: Yes  Drug use: No  PCP: No Ref-Primary, Physician   Marital Status:  Single [1]     Review of Systems   Constitutional: Negative for fever.   Respiratory: Negative for cough.    Gastrointestinal: Negative for vomiting.   Neurological: Positive for seizures.   All other systems reviewed and are negative.        Physical Exam     Patient Vitals for the past 24 hrs:   BP Temp Temp src Pulse SpO2 Height Weight   10/06/20 0642 118/53 -- -- 99 98 % -- --   10/06/20 0426 (!) 136/108 98.1  F (36.7  C) Oral 72 99 % 1.727 m (5' 8\") 56.7 kg (125 lb)      Physical Exam  General: Appears well-developed and well-nourished.   Head: No signs of trauma.   Mouth/Throat: Oropharynx is clear and moist.   Eyes: Conjunctivae are normal. Pupils are equal, round, and reactive to light.   Neck: " Normal range of motion. No nuchal rigidity. No cervical adenopathy  CV: Normal rate and regular rhythm.    Resp: Effort normal and breath sounds normal. No respiratory distress.   GI: Soft. There is no tenderness.  No rebound or guarding.  Normal bowel sounds.   MSK: Normal range of motion. no edema. No Calf tenderness.  Neuro: The patient is alert and oriented to person, place, and time.  PERRLA, EOMI, strength in upper/lower extremities normal and symmetrical. Sensation normal. Speech normal.  GCS 15  Skin: Skin is warm and dry. No rash noted.   Psych: normal mood and affect. behavior is normal.       Emergency Department Course   Imaging:  Radiographic findings were communicated with the patient who voiced understanding of the findings.  XR Shoulder Right G/E 3 Views   IMPRESSION: Normal joint spaces and alignment. No fracture.       As read by Radiology.    Laboratory:  CBC: WNL (WBC 6.3, HGB 15.7, )  BMP: Potassium 3.3 (L), Chloride 110 (H), WNL (Creatinine 0.90)    Topiramate: Pending   Asymptomatic COVID-19 Virus by PCR: In process     Interventions:  0643 Ativan 2 mg IV  Medications   topiramate (TOPAMAX) tablet 150 mg (has no administration in time range)   LORazepam (ATIVAN) injection 2 mg (2 mg Intravenous Given 10/6/20 0643)        Emergency Department Course:  Past medical records, nursing notes, and vitals reviewed.  0426: I performed an exam of the patient and obtained history, as documented above.     IV inserted and blood drawn.     The patient was sent for a right shoulder x ray while in the emergency department, findings above    0539: I rechecked the patient. Explained findings to patient.     0625: I spoke with neurology at [facility].   0648: I spoke with neurology at [facility].     0715  I consulted with Dr. Zambrano of the hospitalist services. They are in agreement to accept the patient for admission.    Findings and plan explained to the Patient who consents to admission. Discussed the patient  with Dr. Zambrano, who will admit the patient to an obs bed for further monitoring, evaluation, and treatment.       Impression & Plan    Covid-19  Sissy Stewart was evaluated during a global COVID-19 pandemic, which necessitated consideration that the patient might be at risk for infection with the SARS-CoV-2 virus that causes COVID-19.   Applicable protocols for evaluation were followed during the patient's care.   COVID-19 was considered as part of the patient's evaluation. The plan for testing is:  a test was obtained during this visit.     Medical Decision Making:  Sissy Stewart Is a 24-year-old gentleman with history of seizures who presents after a seizure.  Apparently he has been tapering off of Keppra and onto Topamax and a couple of days ago had completely stopped the Keppra as planned.  Tonight he had a seizure.  Apparently he initially gone to [facility] but given the long wait he had left and had a family member bring him here.  My evaluation patient was alert and oriented and was fully neurologically intact.  Blood work did not show any concerning electrolyte abnormalities.  I did call neurology at [facility] and while waiting for the resident to discuss the case with the attending, the patient had another seizure in the ER.  He was given Ativan.  [facility] was made aware of this and ultimately recommended increasing the Topamax to 150 mg twice daily.  Given the 2 seizures, I felt it is appropriate to admit and observe the patient to ensure his stability at this time.    Diagnosis:    ICD-10-CM    1. Recurrent seizures (H)  G40.909        Disposition:  Admitted to Dr. Zambrano    Discharge Medications:  New Prescriptions    No medications on file     Scribe Disclosure:  I, David Raya, am serving as a scribe at 4:26 AM on 10/6/2020 to document services personally performed by Guilherme Fuentes MD based on my observations and the provider's statements to me.      David Raya  10/6/2020   [facility]  [facility] EMERGENCY DEPT    Scribe Disclosure:  I, Carmelita Walton, am serving as a scribe at 6:27 AM on 10/6/2020 to document services personally performed by Guilherme Fuentes MD based on my observations and the provider's statements to me.           Guilherme Fuentes MD  10/06/20 0722

## 2020-10-06 NOTE — CONSULTS
[facility]    Neurology Consultation     Date of Admission:  10/6/2020      Assessment & Plan   Sissy Stewart is a 24 year old male who was admitted on 10/6/2020. I was asked to see the patient for seizure.  He has a history of epilepsy, and is in the process of transitioning off of Keppra and on to Topamax.  He follows with a neurologist through [facility].  Topamax dose was icnreased in the ED and would recommend continuing the dose of 150mg BID.  .    Follow up as outpatient, and check Topamax level.  Otherwise no additional recs at this time.    I discussed the above diagnosis, assessment, and further testing with the patient.  Total time: 30  Minutes, with more than 50% of the time counseling the patient or coordination of care.       Ryne Aranda MD  [facility] Neurology  Office Phone [phone]      Code Status    Full Code        Reason for Consult   Reason for consult: I was asked by Dr. Zambrano to evaluate this patient for seizure.      Chief Complaint   Seizure    History is obtained from the patient and the electronic medical chart.    History of Present Illness   Sissy Stewart is a 24 year old male who presents with seizure. He has a known seizure disorder, presented to [facility] ER after initially going to [facility] and deciding the wait was too long.  He has been recently weaned off keppra and was titrating up topamax.      He had a seizure the night prior to admission, which led him to go to the ER. Once at [facility] ER he had additional seizures.  He received 2 mg IV lorazepam for treatment.  The ER provider spoke with neuro and they recommended increasing topamax.       Past Medical History   I have reviewed this patient's medical history and updated it with pertinent information if needed.   Past Medical History:   Diagnosis Date     Epilepsia (H)        Past Surgical History   I have reviewed this patient's surgical history and updated it with pertinent information if  needed.  No past surgical history on file.    Prior to Admission Medications   Prior to Admission Medications   Prescriptions Last Dose Informant Patient Reported? Taking?   topiramate (TOPAMAX) 100 MG tablet 10/6/2020 at given 150 mg in ED  Yes Yes   Sig: Take by mouth 2 times daily Pt is in process of tapering off Keppra (records indicate just finished last week), and tapering up on Topiramate.  Pt unsure of dose at this time, but records indicate should be on 75 mg in AM and 100 mg in PM, with change to 100 mg BID ~10/8/20      Facility-Administered Medications: None     Allergies   No Known Allergies    Social History   I have reviewed this patient's social history and updated it with pertinent information if needed. Sissy Stewart  reports that he has been smoking. He does not have any smokeless tobacco history on file. He reports current drug use. Drug: Marijuana. He reports that he does not drink alcohol.    Family History   I have reviewed this patient's family history and updated it with pertinent information if needed.   No family history on file.    Review of Systems   The 10 point Review of Systems is negative other than noted in the HPI or here.     Physical Exam   Temp: 98.3  F (36.8  C) Temp src: Oral BP: 104/46 Pulse: 94     SpO2: 100 % O2 Device: None (Room air)    Vital Signs with Ranges  Temp:  [98.1  F (36.7  C)-98.7  F (37.1  C)] 98.3  F (36.8  C)  Pulse:  [63-99] 94  BP: ()/() 104/46  SpO2:  [98 %-100 %] 100 %  125 lbs 0 oz    General Appearance:  No acute distress  Neuro:       Mental Status Exam:    Awake, alert, speech fluent and appropriate       Cranial Nerves:   CN II-XII intact           Motor:   5/5, symmetric           Reflexes:  2+, symmetric       Sensory:  Grossly intact                   Coordination:   Grossly intact       Gait:  Deferred   Neck: no nuchal rigidity.   CV: RRR       Data   Results for orders placed or performed during the hospital encounter of  10/06/20 (from the past 24 hour(s))   CBC with platelets + differential   Result Value Ref Range    WBC 6.3 4.0 - 11.0 10e9/L    RBC Count 5.13 4.4 - 5.9 10e12/L    Hemoglobin 15.7 13.3 - 17.7 g/dL    Hematocrit 44.6 40.0 - 53.0 %    MCV 87 78 - 100 fl    MCH 30.6 26.5 - 33.0 pg    MCHC 35.2 31.5 - 36.5 g/dL    RDW 12.7 10.0 - 15.0 %    Platelet Count 322 150 - 450 10e9/L    Diff Method Automated Method     % Neutrophils 43.7 %    % Lymphocytes 38.4 %    % Monocytes 6.0 %    % Eosinophils 11.1 %    % Basophils 0.6 %    % Immature Granulocytes 0.2 %    Nucleated RBCs 0 0 /100    Absolute Neutrophil 2.8 1.6 - 8.3 10e9/L    Absolute Lymphocytes 2.4 0.8 - 5.3 10e9/L    Absolute Monocytes 0.4 0.0 - 1.3 10e9/L    Absolute Eosinophils 0.7 0.0 - 0.7 10e9/L    Absolute Basophils 0.0 0.0 - 0.2 10e9/L    Abs Immature Granulocytes 0.0 0 - 0.4 10e9/L    Absolute Nucleated RBC 0.0    Basic metabolic panel   Result Value Ref Range    Sodium 138 133 - 144 mmol/L    Potassium 3.3 (L) 3.4 - 5.3 mmol/L    Chloride 110 (H) 94 - 109 mmol/L    Carbon Dioxide 23 20 - 32 mmol/L    Anion Gap 5 3 - 14 mmol/L    Glucose 82 70 - 99 mg/dL    Urea Nitrogen 11 7 - 30 mg/dL    Creatinine 0.90 0.66 - 1.25 mg/dL    GFR Estimate >90 >60 mL/min/[1.73_m2]    GFR Estimate If Black >90 >60 mL/min/[1.73_m2]    Calcium 8.6 8.5 - 10.1 mg/dL   XR Shoulder Right G/E 3 Views    Narrative    EXAM: XR SHOULDER RT G/E 3 VW  LOCATION: [facility]  DATE/TIME: 10/6/2020 4:45 AM    INDICATION: Seizure. Right shoulder pain.  COMPARISON: None.      Impression    IMPRESSION: Normal joint spaces and alignment. No fracture.                        Imaging and procedures:  I personally reviewed these images.

## 2020-10-06 NOTE — PHARMACY-ADMISSION MEDICATION HISTORY
Pharmacy Medication History  Admission medication history interview status for the 10/6/2020  admission is complete. See EPIC admission navigator for prior to admission medications       Medication history sources: Patient and Care Everywhere  Location of interview: Phone  Medication history source reliability: Moderate  Adherence assessment: unable to assess      Additional medication history information:   Pt is currently post-ictal, and can only tell me that he is taking one medication only - Topiramate.  Can't tell me dosage at this time.  States he takes it twice daily.  Starting 8/21/20 per [facility] neuro note, plan was:  1. Taper off of keppra  AM PM  Week 1 1500mg 1250mg   Week 2 1250mg 1250mg  Week 3 1250mg 1000mg  Week 4 1000mg 1000mg  Week 5 500mg 500mg  Week 6 250mg 250mg  Week 7 Off Off     2. Start topiramate  AM PM  Week 1 0mg 25mg  Week 2 25mg 25mg  Week 3 25mg 50mg  Week 4 50mg 50mg  Week 5 50mg 75mg  Week 6 75mg 75mg  Week 7 75mg 100mg  Week 8+ 100mg 100mg      Medication reconciliation completed by provider prior to medication history? No    Time spent in this activity: 20 minutes      Prior to Admission medications    Medication Sig Last Dose Taking? Auth Provider   topiramate (TOPAMAX) 100 MG tablet Take by mouth 2 times daily Pt is in process of tapering off Keppra (records indicate just finished last week), and tapering up on Topiramate.  Pt unsure of dose at this time, but records indicate should be on 75 mg in AM and 100 mg in PM, with change to 100 mg BID ~10/8/20 10/6/2020 at given 150 mg in ED Yes Unknown, Entered By History

## 2020-10-06 NOTE — ED NOTES
"[facility]  ED Nurse Handoff Report    ED Chief complaint: Seizures      ED Diagnosis:   Final diagnoses:   Recurrent seizures (H)       Code Status: Full Code    Allergies: No Known Allergies    Patient Story: Hx of seizure.  Was at [facility] and left triage and came here  Focused Assessment:  Had seizure yesterday.  Started new med and taken off Keppra.  Pt AxO.  States he doesn't feel right    Treatments and/or interventions provided: Labs and Ativan given after patient having witnessed seizure  Patient's response to treatments and/or interventions: Pt sleeping at this time.  Vitals stable    To be done/followed up on inpatient unit:  Monitor.  Seizure precautions    Does this patient have any cognitive concerns?: na    Activity level - Baseline/Home:  Independent  Activity Level - Current:   Independent    Patient's Preferred language: English   Needed?: No    Isolation: None  Infection: Not Applicable  Bariatric?: No    Vital Signs:   Vitals:    10/06/20 0426 10/06/20 0642   BP: (!) 136/108 118/53   Pulse: 72 99   Temp: 98.1  F (36.7  C)    TempSrc: Oral    SpO2: 99% 98%   Weight: 56.7 kg (125 lb)    Height: 1.727 m (5' 8\")        Cardiac Rhythm:     Was the PSS-3 completed:   Yes  What interventions are required if any?  NA             Family Comments: Family went home  OBS brochure/video discussed/provided to patient/family: N/A              Name of person given brochure if not patient: na              Relationship to patient: Unk    For the majority of the shift this patient's behavior was Green.   Behavioral interventions performed were na.    ED NURSE PHONE NUMBER: [id number]       "

## 2020-10-06 NOTE — PLAN OF CARE
Patient here with seizures, patient alert x 4,sleepy, neuros intact. Patient called nurse to room 1545 and felt like he was having aura before seizure, stayed with patient and mild shaking of arms resolved and no seizure occurred. Patient up in room 1 assist to bathroom. Patient potassium 3.3, replaced and recheck will be at 1900. Discharge plan pending. Scores green on aggression tool.     Belongings - shoes,cloths cell phone remain with patient    7-11p patient has had no seizure activity but will has some anxiety at times about having another seizure. Patient more awake this evening.

## 2020-10-06 NOTE — H&P
[facility]  Hospitalist History and Physical    Name: Sissy Stewart    MRN: [id number]  YOB: 1995    Age: 24 year old  Date of Admission:  10/6/2020  Physician:  Clyde Zambrano DO, [facility]    Assessment & Plan   Sissy Stewart is a 24 year old male with a known seizure disorder who presented to [facility] ER after initially going to [facility] and deciding the wait was too long.  He has been recently weaned off keppra and was titrating up topamax.  In the ER here he had additional seizures.    Seizures with a known seizure disorder:  -  Recently weaning off Keppra and titrating up topamax.  Received topamax increase to 150 mg in the ED.  Initially ED provider spoke with [facility] neurology but then the patient had additional seizures in the ED requiring IV lorazepam and admission.    -  Seizure precautions and neuro checks  -  Hold on brain imaging as known seizure disorder with no new focal neuro deficits.    Right shoulder pain after earlier seizure:  -  X-ray negative for fracture.  Moving better now.  Monitor.  Tylenol PRN pain.    Hypokalemia:  -  Replace per protocol    DVT Prophylaxis: Pneumatic Compression Devices  Code Status: Full Code    Disposition:  May require a couple night hospital stay.      Primary Care Physician   Physician No Ref-Primary, None    Chief Complaint   Seizures    History is obtained from the patient.  I also spoke with the ER provider about the history.     History of Present Illness   Sissy Stewart is a 24 year old male with a known seizure disorder who presented to [facility] ER after initially going to [facility] and deciding the wait was too long.  He has been recently weaned off keppra and was titrating up topamax.  He had a seizure during the night which led to him presenting to the ED.   Once at [facility] ER he had additional seizures.  He received 2 mg IV lorazepam and then a post-ictal period.  The ED provider had spoken with neuro and they had recommended  increasing topamax.  Patient denies chest pain, sob, nausea, fevers, other acute complaints.        Past Medical History    Past Medical History:   Diagnosis Date     Epilepsia (H)        Prior to Admission Medications   Prior to Admission Medications   Prescriptions Last Dose Informant Patient Reported? Taking?   topiramate (TOPAMAX) 100 MG tablet 10/6/2020 at given 150 mg in ED  Yes Yes   Sig: Take by mouth 2 times daily Pt is in process of tapering off Keppra (records indicate just finished last week), and tapering up on Topiramate.  Pt unsure of dose at this time, but records indicate should be on 75 mg in AM and 100 mg in PM, with change to 100 mg BID ~10/8/20      Facility-Administered Medications: None     Allergies   No Known Allergies    Social History   Social History     Tobacco Use     Smoking status: Current Every Day Smoker   Substance Use Topics     Alcohol use: Never     Frequency: Never   Denied illicit drug use.        Family History   Reviewed, non-contributory.    Review of Systems   A Comprehensive greater than 10 system review of systems was carried out.  Pertinent positives and negatives are noted above.  Otherwise negative for contributory information.    Physical Exam   Temp: 98.1  F (36.7  C) Temp src: Oral BP: 124/79 Pulse: 87     SpO2: 99 % O2 Device: None (Room air)    Vital Signs with Ranges  Temp:  [98.1  F (36.7  C)] 98.1  F (36.7  C)  Pulse:  [72-99] 87  BP: (110-136)/() 124/79  SpO2:  [98 %-100 %] 99 %  125 lbs 0 oz    GEN:  Alert, oriented x 3, still a little groggy and confused with some details, appears comfortable, no overt distress  HEENT:  Normocephalic/atraumatic, no scleral icterus, no nasal discharge, mouth moist.  CV:  Regular rate and rhythm, no loud murmur or rub.  LUNGS:  Clear to auscultation bilaterally without rales/rhonchi/wheezing/retractions.  Symmetric chest rise on inhalation noted.  ABD:  Active bowel sounds, soft, non-tender/non-distended.  No  rebound/guarding/rigidity.  EXT:  No edema.  No cyanosis.  No acute joint synovitis noted.  SKIN:  Dry to touch, no exanthems noted in the visualized areas.  NEURO:  Symmetric muscle strength, sensation to touch grossly intact.  No new focal deficits appreciated.      Data   Data reviewed today:  I personally reviewed no images or EKG's today.    Recent Labs   Lab 10/06/20  0452   WBC 6.3   HGB 15.7   HCT 44.6   MCV 87        Recent Labs   Lab 10/06/20  0452      POTASSIUM 3.3*   CHLORIDE 110*   CO2 23   ANIONGAP 5   GLC 82   BUN 11   CR 0.90   GFRESTIMATED >90   GFRESTBLACK >90   MARCELLO 8.6       Recent Results (from the past 24 hour(s))   XR Shoulder Right G/E 3 Views    Narrative    EXAM: XR SHOULDER RT G/E 3 VW  LOCATION: [facility]  DATE/TIME: 10/6/2020 4:45 AM    INDICATION: Seizure. Right shoulder pain.  COMPARISON: None.      Impression    IMPRESSION: Normal joint spaces and alignment. No fracture.

## 2020-10-07 VITALS
BODY MASS INDEX: 18.94 KG/M2 | HEART RATE: 71 BPM | HEIGHT: 68 IN | WEIGHT: 125 LBS | SYSTOLIC BLOOD PRESSURE: 136 MMHG | RESPIRATION RATE: 16 BRPM | DIASTOLIC BLOOD PRESSURE: 60 MMHG | TEMPERATURE: 98.7 F | OXYGEN SATURATION: 100 %

## 2020-10-07 LAB
ANION GAP SERPL CALCULATED.3IONS-SCNC: 7 MMOL/L (ref 3–14)
BUN SERPL-MCNC: 12 MG/DL (ref 7–30)
CALCIUM SERPL-MCNC: 8.9 MG/DL (ref 8.5–10.1)
CHLORIDE SERPL-SCNC: 113 MMOL/L (ref 94–109)
CO2 SERPL-SCNC: 20 MMOL/L (ref 20–32)
CREAT SERPL-MCNC: 0.95 MG/DL (ref 0.66–1.25)
ERYTHROCYTE [DISTWIDTH] IN BLOOD BY AUTOMATED COUNT: 13.1 % (ref 10–15)
GFR SERPL CREATININE-BSD FRML MDRD: >90 ML/MIN/{1.73_M2}
GLUCOSE SERPL-MCNC: 85 MG/DL (ref 70–99)
HCT VFR BLD AUTO: 45.8 % (ref 40–53)
HGB BLD-MCNC: 15.7 G/DL (ref 13.3–17.7)
MCH RBC QN AUTO: 30.1 PG (ref 26.5–33)
MCHC RBC AUTO-ENTMCNC: 34.3 G/DL (ref 31.5–36.5)
MCV RBC AUTO: 88 FL (ref 78–100)
PLATELET # BLD AUTO: 317 10E9/L (ref 150–450)
POTASSIUM SERPL-SCNC: 3.6 MMOL/L (ref 3.4–5.3)
RBC # BLD AUTO: 5.22 10E12/L (ref 4.4–5.9)
SODIUM SERPL-SCNC: 140 MMOL/L (ref 133–144)
TOPIRAMATE SERPL-MCNC: 7 UG/ML (ref 5–20)
WBC # BLD AUTO: 7.6 10E9/L (ref 4–11)

## 2020-10-07 PROCEDURE — 36415 COLL VENOUS BLD VENIPUNCTURE: CPT | Performed by: INTERNAL MEDICINE

## 2020-10-07 PROCEDURE — 250N000013 HC RX MED GY IP 250 OP 250 PS 637: Performed by: INTERNAL MEDICINE

## 2020-10-07 PROCEDURE — 99207 PR CDG-CODE INCORRECT PER BILLING BASED ON TIME: CPT | Performed by: INTERNAL MEDICINE

## 2020-10-07 PROCEDURE — 85027 COMPLETE CBC AUTOMATED: CPT | Performed by: INTERNAL MEDICINE

## 2020-10-07 PROCEDURE — 99239 HOSP IP/OBS DSCHRG MGMT >30: CPT | Performed by: INTERNAL MEDICINE

## 2020-10-07 PROCEDURE — 80048 BASIC METABOLIC PNL TOTAL CA: CPT | Performed by: INTERNAL MEDICINE

## 2020-10-07 RX ORDER — TOPIRAMATE 50 MG/1
150 TABLET, FILM COATED ORAL 2 TIMES DAILY
Qty: 180 TABLET | Refills: 0 | Status: SHIPPED | OUTPATIENT
Start: 2020-10-07 | End: 2022-10-12

## 2020-10-07 RX ADMIN — TOPIRAMATE 150 MG: 50 TABLET, FILM COATED ORAL at 09:01

## 2020-10-07 ASSESSMENT — ACTIVITIES OF DAILY LIVING (ADL)
ADLS_ACUITY_SCORE: 15

## 2020-10-07 NOTE — PLAN OF CARE
Pt here with breakthrough seizure. A&O. Neuros intact. VSS. Regular diet, thin liquids. Up with SBA. Denies pain. No seizures noted overnight, on seizure precautions. Pt scoring green on the Aggression Stop Light Tool. Plan to meet with neuro today.

## 2020-10-07 NOTE — PROGRESS NOTES
Care Management Assessment and Discharge Consult    General Information  Patient was suggested to [pursue mental health therapy at [facility].  SW provided referral information for pt to reach out by phone or email to contact them.                   Advance Care Planning: None       Communication Assessment  Patient's communication style: spoken language Bilingual)         Cognitive  Cognitive/Neuro/Behavioral: WDL                 Speech: clear, spontaneous    Living Environment:   People in home:  Unknown   Current living Arrangements: house        Family/Social Support:  Care provided by:  Mom  Provides care for:  Self only   Description of Support System:           Family and friends   Current Resources:   Skilled Home Care Services: N/A    Community Resources:  Provided information for [facility] mental health  Equipment currently used at home:  N/A  Supplies currently used at home:  N/A    Employment:  Employment Status: Unknown   Financial/Environmental Concerns:   Lifestyle & Psychosocial Needs: None identified        Socioeconomic History     Marital status: Single     Spouse name: Not on file     Number of children: Not on file     Years of education: Not on file     Highest education level: Not on file     Tobacco Use     Smoking status: Current Every Day Smoker   Substance and Sexual Activity     Alcohol use: Never     Frequency: Never     Drug use: Yes     Types: Marijuana       Functional Status:  Prior to admission patient needed assistance:          Mental Health Status:  WDL         Care Management Discharge Note    Discharge Planning:  Expected Discharge Date: 10/07/20     Concerns to be Addressed: Provided therapy resource    Anticipated Discharge Disposition: Home    Anticipated Discharge Services: Will reach out and contact mental health independently   Anticipated Discharge DME:  None    P   Disposition Comments:      Selected Continued Care - Admitted Since 10/6/2020    No services have been selected for  the patient.         Additional Information:  Information given to mother      Luz Marina Moreno, LICSW

## 2020-10-07 NOTE — DISCHARGE INSTRUCTIONS
Understanding Seizures    What is a seizure?    A sudden is a sudden, brief change in electrical activity in your brain. Seizures can be caused by injury to the brain, scar formation, tumors, stroke or infection. Often the cause is not known. With treatment, most people lead normal lives.      What is the treatment for seizures?    The main treatment for seizures are anti-epileptic drugs (AEDs). These drugs greatly reduce or prevent seizures in most people. Tips for taking your medicines:    Never stop taking your seizure medicine without talking to your doctor. Do not stop your drugs because seizures have stopped.    Do not skip a dose. This can change how well the drug works.    If you miss a dose, take it as soon as you remember. If it is within a few hours of your next dose, skip the dose you forgot, and take your next dose. After that, go back to the normal schedule.    If you miss more than one dose, talk to your doctor or nurse.    NEVER take double doses.    Get refills ahead of time before you run out.    Store your medicines in a dry place. Keep medicines in the bottle that they came in. Throw away all used bottles.    Keep a list of the medicines you take.    Don't take herbal supplements or antacids without talking with your doctor first. And ask your pharmacist about taking drugstore medicines.    If you need help remembering to take your medicines, use a pillbox.    Ask family, friends or coworkers to remind you when it is time to take a dose.    Your blood levels will need to be tested to be sure you are getting the right dose. Your doctor will tell you when you should be tested.    If you have any side effects or another seizure, your doctor may need to adjust the dose or change the medicines.      What are the side effects of AED medicines?    You may have: dizziness, trouble thinking, tiredness, stomach upset, weight gain or loss, depression or allergic reaction (such as rash or fever).    If this  is a new drug or your dose has changed, the side effects should go away. Always talk to your doctor about any side effects. Your doctor can suggest ways to manage them.      What can I do to take care of myself?    Your lifestyle  Keep to a schedule as much as you can. Get plenty of sleep. Try to manage your stress.  Most people with seizures can lead an active life. Plan ahead for activities that carry some risk. You should:    Always swim with a  or chastity present.    Wear a helmet for biking, skiing and similar sports.    Ask your doctor about contact sports.    Avoid activities where having a seizure would put you or someone else in danger.    Safe activities include jogging, aerobics, cross-country skiing, dancing, hiking, bowling and tennis.    It is a good idea to wear a medical alert bracelet. Tell family members, friends and co-workers about your seizure disorder.    Your home  Be sure your home is set up to be safe.    Keep your bathroom and bedroom doors unlocked. Do not block these doors.    Take showers only. In a bath, you risk drowning during a seizure.    Replace all glass doors with safety glass or plastic.    Fill your plate or bowl near the stove instead of carrying all the food to the table.    When cooking, turn the pot and pan handles towards the back of the stove.    Driving your car is generally safe and legal once seizures are under control. [place] state law says that a person must not drive for 3 months if a seizure is resulted in loss of consciousness.    Brain alerting chemicals  Many chemicals can cause seizures. Even small amounts of alcohol can trigger seizures. Illegal drugs can also cause severe seizures, even in people who do not normally have them.      How should family and friends respond to a seizure?    There is no way to stop a seizure. Family and caregivers can try to make sure you are safe.    When a seizure starts, family or caregivers should:    Keep you from  falling.    Loosen tight clothing, especially around the neck.    Cushion the head.    Clear the area of furniture and sharp objects.    Turn you on your side. If you vomit, this helps prevent inhaling vomit.    Stay with you until you recover or medical help arrives.    Take your pulse and watch your rate of breathing, if possible.    Seizures typically last less than 3 minutes. Afterward, the person may be tired, confused and achy. HE or she may need to sleep for several hours to recover.    Call 911 if:    A seizure lasts more than 5 minutes.    This is the first seizure ever.    Another seizure starts before awareness returns after a prior seizure.    The person had a seizure in water.    The person is pregnant, injured or has diabetes.    The person does not have a medical ID bracelet instructing what to do.    The person does not wake up or behave normally.    The seizure is different than the usual seizure.      When someone has a seizure DO NOT    Place anything between the person's teeth (inculding your fingers).    Try to hold the person down    Give the person something to eat or drink until he or she is fully awake and alert.    Move the person unless they are in danger or near a hazard.    Start CPR unless the seizure has stopped and the person is not breathing or has no pulse.    Try to stop the person from convulsing. They have no control over the seizure.      When should I call my doctor?  Call if you have:    Seizures more often. Especially if they have been under control for a long time.    Weakness, problems with seeing or new problems with balance.    Unusual behavior.    Side effects from medicines that are not going away or are getting worse.      Coping at Home    Seizures affect those around you, too. Talk with your loved ones and learn their concerns.    People with seizures can hold many kinds of jobs. But here are some issues to keep in mind.    Your work needs to be safe. How well  controlled are your seizures? Does the job involve tasks that may not be safe for you, such as driving or using heavy machinery?    You may want to tell your boss or co-workers about your seizures. This is your choice. But it may be safer if people at your workplace are prepared to respond to a seizure. If you are concerned about losing your job, know your rights. The Americans with Disabilities Act provides work related protections for people with epilepsy.    Know the signs of depression. Depression can affect your thoughts and feelings. It can be caused by trouble coping with seizures. Call your doctor if you are having problems with this.    Resources    Epilepsy foundation: www.epilepsyfoundationmn.org  Epilepsy.com: www.epilepsy.com  National [place] of Neurological Disorders and Stroke: www.ninds.nih.gov  KidsHealth.org: www.kidshealth.org  The [place] Equal Opportunity Commission: www.eeoc.giv/facts/epilepsy.html   Phone: 942.548.5821

## 2020-10-07 NOTE — PROGRESS NOTES
Patient seen in follow up for seizure disorder.  Had a few events overnight concerning for seizure aura versus panic.  No generalized seizure activity.  He is interested in talking with psychology about stress anxiety and other issues, and I would encourage him to pursue this.  Hopefully this is something that can be coordinated before he leaves the hospital or with his primary care [place] [facility]    For now, continue Topamax at current dose of 150mg BID.

## 2020-10-07 NOTE — PROGRESS NOTES
Requested to schedule follow up with patient's [facility] Neurologist.     Spoke to patient and his mother about appointment scheduled.  AVS updated with following information:    Please call the clinic if you have any questions about these appointments ([phone])     An appointment has been scheduled with Dr. Pruitt, [facility] Neurology for Friday October 9 at 9:40 a.m.     You also have an appointment that was previously scheduled for October 21 at 9:40 a.m.  Please check with your doctor if you need to keep this appointment when at your appointment on October 9    Lore Dumont RN, BSN, PHN  Inpatient Care Coordination  [facility]  Phone: [phone]

## 2020-10-07 NOTE — PLAN OF CARE
Pt admitted with breakthrough seizures. No witnessed or reported seizures while on unit. VS on RA. Neuros intact. Denies pain. Ambulating with SBA. Tolerating regular diet. Assessment and discharge completed with the assistance of tele interpretor. Reviewed written discharge instructions including medications, follow-up appointments and seizure information. Discharged home with mother.

## 2020-10-22 ENCOUNTER — TRANSFERRED RECORDS (OUTPATIENT)
Dept: HEALTH INFORMATION MANAGEMENT | Facility: CLINIC | Age: 25
End: 2020-10-22

## 2020-10-22 ENCOUNTER — MEDICAL CORRESPONDENCE (OUTPATIENT)
Dept: HEALTH INFORMATION MANAGEMENT | Facility: CLINIC | Age: 25
End: 2020-10-22

## 2021-02-09 ENCOUNTER — TELEPHONE (OUTPATIENT)
Dept: NEUROLOGY | Facility: CLINIC | Age: 26
End: 2021-02-09

## 2021-02-09 NOTE — TELEPHONE ENCOUNTER
Patient was referred to [facility] by Eliud Kidd CNP.  Unable to reach patient when contacting to schedule.  [facility] notified.

## 2022-10-12 ENCOUNTER — HOSPITAL ENCOUNTER (INPATIENT)
Dept: NEUROLOGY | Facility: CLINIC | Age: 27
Discharge: HOME | End: 2022-10-12
Attending: HOSPITALIST
Payer: COMMERCIAL

## 2022-10-12 ENCOUNTER — APPOINTMENT (OUTPATIENT)
Dept: CT IMAGING | Facility: CLINIC | Age: 27
End: 2022-10-12
Attending: EMERGENCY MEDICINE
Payer: COMMERCIAL

## 2022-10-12 ENCOUNTER — HOSPITAL ENCOUNTER (INPATIENT)
Facility: CLINIC | Age: 27
LOS: 1 days | Discharge: HOME | End: 2022-10-13
Attending: EMERGENCY MEDICINE | Admitting: HOSPITALIST
Payer: COMMERCIAL

## 2022-10-12 DIAGNOSIS — G40.909 RECURRENT SEIZURES (H): ICD-10-CM

## 2022-10-12 LAB
ALBUMIN UR-MCNC: NEGATIVE MG/DL
AMPHETAMINES UR QL SCN: NORMAL
ANION GAP SERPL CALCULATED.3IONS-SCNC: 8 MMOL/L (ref 3–14)
APPEARANCE UR: CLEAR
ATRIAL RATE - MUSE: 81 BPM
BACTERIA #/AREA URNS HPF: ABNORMAL /HPF
BARBITURATES UR QL: NORMAL
BASOPHILS # BLD AUTO: 0.1 10E3/UL (ref 0–0.2)
BASOPHILS NFR BLD AUTO: 1 %
BENZODIAZ UR QL: NORMAL
BILIRUB UR QL STRIP: NEGATIVE
BUN SERPL-MCNC: 11 MG/DL (ref 7–30)
CALCIUM SERPL-MCNC: 9.2 MG/DL (ref 8.5–10.1)
CANNABINOIDS UR QL SCN: NORMAL
CHLORIDE BLD-SCNC: 109 MMOL/L (ref 94–109)
CO2 SERPL-SCNC: 21 MMOL/L (ref 20–32)
COCAINE UR QL: NORMAL
COLOR UR AUTO: ABNORMAL
CREAT SERPL-MCNC: 0.68 MG/DL (ref 0.66–1.25)
DIASTOLIC BLOOD PRESSURE - MUSE: NORMAL MMHG
EOSINOPHIL # BLD AUTO: 1 10E3/UL (ref 0–0.7)
EOSINOPHIL NFR BLD AUTO: 14 %
ERYTHROCYTE [DISTWIDTH] IN BLOOD BY AUTOMATED COUNT: 13.5 % (ref 10–15)
FLUAV RNA SPEC QL NAA+PROBE: NEGATIVE
FLUBV RNA RESP QL NAA+PROBE: NEGATIVE
GFR SERPL CREATININE-BSD FRML MDRD: >90 ML/MIN/1.73M2
GLUCOSE BLD-MCNC: 110 MG/DL (ref 70–99)
GLUCOSE UR STRIP-MCNC: NEGATIVE MG/DL
HCT VFR BLD AUTO: 45.3 % (ref 40–53)
HGB BLD-MCNC: 15 G/DL (ref 13.3–17.7)
HGB UR QL STRIP: NEGATIVE
IMM GRANULOCYTES # BLD: 0 10E3/UL
IMM GRANULOCYTES NFR BLD: 0 %
INTERPRETATION ECG - MUSE: NORMAL
KETONES UR STRIP-MCNC: NEGATIVE MG/DL
LEUKOCYTE ESTERASE UR QL STRIP: NEGATIVE
LYMPHOCYTES # BLD AUTO: 2.8 10E3/UL (ref 0.8–5.3)
LYMPHOCYTES NFR BLD AUTO: 36 %
MCH RBC QN AUTO: 28.7 PG (ref 26.5–33)
MCHC RBC AUTO-ENTMCNC: 33.1 G/DL (ref 31.5–36.5)
MCV RBC AUTO: 87 FL (ref 78–100)
MONOCYTES # BLD AUTO: 0.6 10E3/UL (ref 0–1.3)
MONOCYTES NFR BLD AUTO: 8 %
NEUTROPHILS # BLD AUTO: 3.1 10E3/UL (ref 1.6–8.3)
NEUTROPHILS NFR BLD AUTO: 41 %
NITRATE UR QL: NEGATIVE
NRBC # BLD AUTO: 0 10E3/UL
NRBC BLD AUTO-RTO: 0 /100
OPIATES UR QL SCN: NORMAL
P AXIS - MUSE: 58 DEGREES
PCP UR QL SCN: NORMAL
PH UR STRIP: 7.5 [PH] (ref 5–7)
PLATELET # BLD AUTO: 320 10E3/UL (ref 150–450)
POTASSIUM BLD-SCNC: 3.8 MMOL/L (ref 3.4–5.3)
PR INTERVAL - MUSE: 142 MS
QRS DURATION - MUSE: 96 MS
QT - MUSE: 348 MS
QTC - MUSE: 404 MS
R AXIS - MUSE: 57 DEGREES
RBC # BLD AUTO: 5.23 10E6/UL (ref 4.4–5.9)
RBC URINE: 2 /HPF
RSV RNA SPEC NAA+PROBE: NEGATIVE
SARS-COV-2 RNA RESP QL NAA+PROBE: NEGATIVE
SODIUM SERPL-SCNC: 138 MMOL/L (ref 133–144)
SP GR UR STRIP: 1.01 (ref 1–1.03)
SYSTOLIC BLOOD PRESSURE - MUSE: NORMAL MMHG
T AXIS - MUSE: 30 DEGREES
UROBILINOGEN UR STRIP-MCNC: NORMAL MG/DL
VENTRICULAR RATE- MUSE: 81 BPM
WBC # BLD AUTO: 7.7 10E3/UL (ref 4–11)
WBC URINE: 1 /HPF

## 2022-10-12 PROCEDURE — 36415 COLL VENOUS BLD VENIPUNCTURE: CPT | Performed by: EMERGENCY MEDICINE

## 2022-10-12 PROCEDURE — 80201 ASSAY OF TOPIRAMATE: CPT | Performed by: EMERGENCY MEDICINE

## 2022-10-12 PROCEDURE — 258N000003 HC RX IP 258 OP 636: Performed by: EMERGENCY MEDICINE

## 2022-10-12 PROCEDURE — 250N000013 HC RX MED GY IP 250 OP 250 PS 637: Performed by: EMERGENCY MEDICINE

## 2022-10-12 PROCEDURE — 120N000001 HC R&B MED SURG/OB

## 2022-10-12 PROCEDURE — C9803 HOPD COVID-19 SPEC COLLECT: HCPCS

## 2022-10-12 PROCEDURE — 250N000011 HC RX IP 250 OP 636: Performed by: EMERGENCY MEDICINE

## 2022-10-12 PROCEDURE — 258N000003 HC RX IP 258 OP 636: Performed by: HOSPITALIST

## 2022-10-12 PROCEDURE — 95813 EEG EXTND MNTR 61-119 MIN: CPT

## 2022-10-12 PROCEDURE — 70450 CT HEAD/BRAIN W/O DYE: CPT

## 2022-10-12 PROCEDURE — 96375 TX/PRO/DX INJ NEW DRUG ADDON: CPT

## 2022-10-12 PROCEDURE — 87637 SARSCOV2&INF A&B&RSV AMP PRB: CPT | Performed by: EMERGENCY MEDICINE

## 2022-10-12 PROCEDURE — 80048 BASIC METABOLIC PNL TOTAL CA: CPT | Performed by: EMERGENCY MEDICINE

## 2022-10-12 PROCEDURE — 96365 THER/PROPH/DIAG IV INF INIT: CPT

## 2022-10-12 PROCEDURE — 99223 1ST HOSP IP/OBS HIGH 75: CPT | Mod: AI | Performed by: HOSPITALIST

## 2022-10-12 PROCEDURE — 250N000013 HC RX MED GY IP 250 OP 250 PS 637: Performed by: HOSPITALIST

## 2022-10-12 PROCEDURE — 85025 COMPLETE CBC W/AUTO DIFF WBC: CPT | Performed by: EMERGENCY MEDICINE

## 2022-10-12 PROCEDURE — 80307 DRUG TEST PRSMV CHEM ANLYZR: CPT | Performed by: HOSPITALIST

## 2022-10-12 PROCEDURE — 99207 PR NO BILLABLE SERVICE THIS VISIT: CPT | Performed by: HOSPITALIST

## 2022-10-12 PROCEDURE — 81001 URINALYSIS AUTO W/SCOPE: CPT | Performed by: EMERGENCY MEDICINE

## 2022-10-12 PROCEDURE — 99285 EMERGENCY DEPT VISIT HI MDM: CPT | Mod: 25

## 2022-10-12 PROCEDURE — 250N000011 HC RX IP 250 OP 636

## 2022-10-12 RX ORDER — LANOLIN ALCOHOL/MO/W.PET/CERES
3 CREAM (GRAM) TOPICAL
Status: DISCONTINUED | OUTPATIENT
Start: 2022-10-12 | End: 2022-10-13 | Stop reason: HOSPADM

## 2022-10-12 RX ORDER — LIDOCAINE 40 MG/G
CREAM TOPICAL
Status: DISCONTINUED | OUTPATIENT
Start: 2022-10-12 | End: 2022-10-13 | Stop reason: HOSPADM

## 2022-10-12 RX ORDER — LORAZEPAM 2 MG/ML
2 INJECTION INTRAMUSCULAR
Status: DISCONTINUED | OUTPATIENT
Start: 2022-10-12 | End: 2022-10-13 | Stop reason: HOSPADM

## 2022-10-12 RX ORDER — ONDANSETRON 2 MG/ML
4 INJECTION INTRAMUSCULAR; INTRAVENOUS EVERY 6 HOURS PRN
Status: DISCONTINUED | OUTPATIENT
Start: 2022-10-12 | End: 2022-10-13 | Stop reason: HOSPADM

## 2022-10-12 RX ORDER — ONDANSETRON 4 MG/1
4 TABLET, ORALLY DISINTEGRATING ORAL EVERY 6 HOURS PRN
Status: DISCONTINUED | OUTPATIENT
Start: 2022-10-12 | End: 2022-10-13 | Stop reason: HOSPADM

## 2022-10-12 RX ORDER — ACETAMINOPHEN 650 MG/1
650 SUPPOSITORY RECTAL EVERY 6 HOURS PRN
Status: DISCONTINUED | OUTPATIENT
Start: 2022-10-12 | End: 2022-10-13 | Stop reason: HOSPADM

## 2022-10-12 RX ORDER — TOPIRAMATE 25 MG/1
25 TABLET, FILM COATED ORAL EVERY 12 HOURS SCHEDULED
Status: DISCONTINUED | OUTPATIENT
Start: 2022-10-12 | End: 2022-10-13

## 2022-10-12 RX ORDER — LORAZEPAM 2 MG/ML
1 INJECTION INTRAMUSCULAR ONCE
Status: COMPLETED | OUTPATIENT
Start: 2022-10-12 | End: 2022-10-12

## 2022-10-12 RX ORDER — LORAZEPAM 2 MG/ML
2 INJECTION INTRAMUSCULAR
Status: DISCONTINUED | OUTPATIENT
Start: 2022-10-12 | End: 2022-10-12

## 2022-10-12 RX ORDER — TOPIRAMATE 25 MG/1
25 TABLET, FILM COATED ORAL ONCE
Status: COMPLETED | OUTPATIENT
Start: 2022-10-12 | End: 2022-10-12

## 2022-10-12 RX ORDER — AMOXICILLIN 250 MG
1 CAPSULE ORAL 2 TIMES DAILY PRN
Status: DISCONTINUED | OUTPATIENT
Start: 2022-10-12 | End: 2022-10-13 | Stop reason: HOSPADM

## 2022-10-12 RX ORDER — SODIUM CHLORIDE 9 MG/ML
INJECTION, SOLUTION INTRAVENOUS CONTINUOUS
Status: DISCONTINUED | OUTPATIENT
Start: 2022-10-12 | End: 2022-10-13 | Stop reason: HOSPADM

## 2022-10-12 RX ORDER — AMOXICILLIN 250 MG
2 CAPSULE ORAL 2 TIMES DAILY PRN
Status: DISCONTINUED | OUTPATIENT
Start: 2022-10-12 | End: 2022-10-13 | Stop reason: HOSPADM

## 2022-10-12 RX ORDER — LORAZEPAM 2 MG/ML
INJECTION INTRAMUSCULAR
Status: COMPLETED
Start: 2022-10-12 | End: 2022-10-12

## 2022-10-12 RX ORDER — ACETAMINOPHEN 325 MG/1
650 TABLET ORAL EVERY 6 HOURS PRN
Status: DISCONTINUED | OUTPATIENT
Start: 2022-10-12 | End: 2022-10-13 | Stop reason: HOSPADM

## 2022-10-12 RX ORDER — HYDROMORPHONE HCL IN WATER/PF 6 MG/30 ML
0.2 PATIENT CONTROLLED ANALGESIA SYRINGE INTRAVENOUS
Status: DISCONTINUED | OUTPATIENT
Start: 2022-10-12 | End: 2022-10-13 | Stop reason: HOSPADM

## 2022-10-12 RX ADMIN — SODIUM CHLORIDE, PRESERVATIVE FREE: 5 INJECTION INTRAVENOUS at 10:05

## 2022-10-12 RX ADMIN — TOPIRAMATE 25 MG: 25 TABLET, FILM COATED ORAL at 00:58

## 2022-10-12 RX ADMIN — TOPIRAMATE 25 MG: 25 TABLET, FILM COATED ORAL at 20:29

## 2022-10-12 RX ADMIN — LEVETIRACETAM 1500 MG: 100 INJECTION, SOLUTION INTRAVENOUS at 02:44

## 2022-10-12 RX ADMIN — TOPIRAMATE 25 MG: 25 TABLET, FILM COATED ORAL at 07:46

## 2022-10-12 RX ADMIN — LORAZEPAM 1 MG: 2 INJECTION INTRAMUSCULAR; INTRAVENOUS at 02:05

## 2022-10-12 RX ADMIN — SODIUM CHLORIDE, PRESERVATIVE FREE: 5 INJECTION INTRAVENOUS at 20:29

## 2022-10-12 RX ADMIN — LORAZEPAM 1 MG: 2 INJECTION INTRAMUSCULAR at 02:05

## 2022-10-12 ASSESSMENT — ACTIVITIES OF DAILY LIVING (ADL)
ADLS_ACUITY_SCORE: 35
ADLS_ACUITY_SCORE: 18
ADLS_ACUITY_SCORE: 35
ADLS_ACUITY_SCORE: 35
ADLS_ACUITY_SCORE: 18
ADLS_ACUITY_SCORE: 35
ADLS_ACUITY_SCORE: 35
ADLS_ACUITY_SCORE: 18
ADLS_ACUITY_SCORE: 35
ADLS_ACUITY_SCORE: 18

## 2022-10-12 ASSESSMENT — ENCOUNTER SYMPTOMS
SEIZURES: 1
SORE THROAT: 0
RHINORRHEA: 0
COUGH: 0

## 2022-10-12 NOTE — ED NOTES
[facility]  ED Nurse Handoff Report    ED Chief complaint: Seizures      ED Diagnosis:   Final diagnoses:   Recurrent seizures (H)       Code Status: Full Code    Allergies: No Known Allergies    Patient Story: Had a witnessed seizure like activity at home by family that lasted 1 min. Last seizure like activity was 2 years ago. He is currently on no seizure medications  Focused Assessment:  Seizure like activities.     Treatments and/or interventions provided: Ativan, Keppra, Topirmate, Labs, and head CT  Patient's response to treatments and/or interventions: Drowsy.     To be done/followed up on inpatient unit:  Monitor for seizure like activity and neuro to see.     Does this patient have any cognitive concerns?: none    Activity level - Baseline/Home:  Independent  Activity Level - Current:   Stand with Assist    Patient's Preferred language: English   Needed?: No    Isolation: None  Infection: Not Applicable  Patient tested for COVID 19 prior to admission: YES  Bariatric?: No    Vital Signs:   Vitals:    10/12/22 0037 10/12/22 0045 10/12/22 0100 10/12/22 0130   BP: (!) 139/90 (!) 149/85 131/86 123/82   Pulse: 104 106 105 83   Resp: 18 20 15 20   Temp: 99.2  F (37.3  C)      TempSrc: Oral      SpO2: 100% 97% 99% 100%       Cardiac Rhythm:     Was the PSS-3 completed:   Yes  What interventions are required if any?               Family Comments: none  OBS brochure/video discussed/provided to patient/family: N/A              Name of person given brochure if not patient: n/a              Relationship to patient: n/a    For the majority of the shift this patient's behavior was Green.   Behavioral interventions performed were none.    ED NURSE PHONE NUMBER: [phone]

## 2022-10-12 NOTE — ED TRIAGE NOTES
Patient had what appeared to be jerking non responsiveness for 1 mins. He had a similar episode like this 2 years ago. Doesn't take seizure medications.

## 2022-10-12 NOTE — ED PROVIDER NOTES
History   Chief Complaint:  Seizures     History limited by: postictal state.      Sissy Stewart is a 26 year old male with history of recurrent seizures who presents with seizures. The patient's family called EMS tonight as the patient became unresponsive and had jerking movements lasting a minute. His last seizure was two years ago. He had been taking Topamax, but he currently states he is not taking this medication. He denies any current pain, cough, rhinorrhea or sore throat. Of note, the patient does not use tobacco or alcohol. He has not had any recent falls.     Review of Systems   HENT: Negative for rhinorrhea and sore throat.    Respiratory: Negative for cough.    Neurological: Positive for seizures.   All other systems reviewed and are negative.    Allergies:  The patient has no known allergies.     Medications:  No current out patient medications.    Past Medical History:    Epilepsia  Closed fracture dislocation of left shoulder    Social History:  The patient was accompanied to the ED by EMS.  The patient does not use tobacco or alcohol.    Physical Exam     Patient Vitals for the past 24 hrs:   BP Temp Temp src Pulse Resp SpO2   10/12/22 0330 117/50 -- -- 95 21 95 %   10/12/22 0130 123/82 -- -- 83 20 100 %   10/12/22 0100 131/86 -- -- 105 15 99 %   10/12/22 0045 (!) 149/85 -- -- 106 20 97 %   10/12/22 0037 (!) 139/90 99.2  F (37.3  C) Oral 104 18 100 %       Physical Exam  Constitutional: Well developed, nontox appearance  Head: Atraumatic.   Mouth/Throat: Oropharynx is clear and moist. Tongue bite marks noted predominantly on left  Neck:  no stridor  Eyes: no scleral icterus, PERRL, EOMI  Cardiovascular: RRR, 2+ bilat radial pulses  Pulmonary/Chest: nml resp effort, Clear BS bilat  Abdominal: ND, soft, NT, no rebound or guarding   Ext: Warm, well perfused, no edema  Neurological: A&O,  CNII-XII intact, nml finger to nose, 5/5 strength throughout upper and lower ext, symmetric; sensation  grossly intact  Skin: Skin is warm and dry.   Psychiatric: Behavior is normal. Thought content normal.   Nursing note and vitals reviewed.      Emergency Department Course   Imaging:  CT Head w/o Contrast   Final Result   IMPRESSION:   1.  No acute intracranial process.        Laboratory:  Labs Ordered and Resulted from Time of ED Arrival to Time of ED Departure   BASIC METABOLIC PANEL - Abnormal       Result Value    Sodium 138      Potassium 3.8      Chloride 109      Carbon Dioxide (CO2) 21      Anion Gap 8      Urea Nitrogen 11      Creatinine 0.68      Calcium 9.2      Glucose 110 (*)     GFR Estimate >90     CBC WITH PLATELETS AND DIFFERENTIAL - Abnormal    WBC Count 7.7      RBC Count 5.23      Hemoglobin 15.0      Hematocrit 45.3      MCV 87      MCH 28.7      MCHC 33.1      RDW 13.5      Platelet Count 320      % Neutrophils 41      % Lymphocytes 36      % Monocytes 8      % Eosinophils 14      % Basophils 1      % Immature Granulocytes 0      NRBCs per 100 WBC 0      Absolute Neutrophils 3.1      Absolute Lymphocytes 2.8      Absolute Monocytes 0.6      Absolute Eosinophils 1.0 (*)     Absolute Basophils 0.1      Absolute Immature Granulocytes 0.0      Absolute NRBCs 0.0     INFLUENZA A/B & SARS-COV2 PCR MULTIPLEX - Normal    Influenza A PCR Negative      Influenza B PCR Negative      RSV PCR Negative      SARS CoV2 PCR Negative     TOPIRAMATE LEVEL   ROUTINE UA WITH MICROSCOPIC REFLEX TO CULTURE      Emergency Department Course:     Reviewed:  I reviewed nursing notes, vitals, past medical history and care everywhere    Assessments:  0039 I obtained history and examined the patient as noted above.     0204 The patient had a seizure.    0338 I rechecked and updated the patient regarding the imaging results, laboratory results and the plan for care.    Consults:   0348 I spoke with the hospitalist, Dr. Sarmiento, regarding placement for the patient.    Interventions:  0058 Topamax 25mg PO  0205 Lorazepam 1mg  IV  0244 Keppra 1,500mg IV    Disposition:  The patient was admitted to the hospital under the care of Dr. Sarmiento.     Impression & Plan   Medical Decision Making:  Sissy Stewart is a 26 year old male presenting w/ recurrent seizures     DDx includes epilepsy, seizure disorder NOS, seizure NOS, electrolyte abnormality, syncope, dehydration, intracranial abnormality such as mass or hemorrhage.  EKG interpretation as noted above.  Clinically this seems most consistent with a seizure particular given bite marks to tongue.  Labs unremarkable.  Imaging unremarkable.    Given the patient's known seizure disorder and to seizures in less than 24 hours including 1 in the emergency department, I think would be appropriate to have the patient admitted to the hospitalist service for further evaluation and neurology consult.  I am unsure whether he has been noncompliant with his Topamax but given he has not been taking it recently, he was loaded with Keppra in the interim. Pt counseled on all results, disposition and diagnosis.  They are understanding and agreeable to plan. Patient discharged in stable condition.    Diagnosis:    ICD-10-CM    1. Recurrent seizures (H)  G40.909         Scribe Disclosure:  KAVITHA, Maxwell Irwin, am serving as a scribe at 12:43 AM on 10/12/2022 to document services personally performed by Horacio Warner MD based on my observations and the provider's statements to me.      Horacio Warner MD  10/12/22 0537

## 2022-10-12 NOTE — PHARMACY-ADMISSION MEDICATION HISTORY
.Pharmacy Medication History  Admission medication history interview status for the 10/12/2022  admission is complete. See EPIC admission navigator for prior to admission medications     Location of Interview: Patient room  Medication history sources: Patient, Surescripts and Care Everywhere    Significant changes made to the medication list:  --removed: topiramate 150 mg BID (old Rx from 2020)    Additional medication history information:   -- no recent prescription medications. Most recent found was the topiramate from 2020.     Medication reconciliation completed by provider prior to medication history? No    Time spent in this activity: 8 min    Prior to Admission medications    Not on File       The information provided in this note is only as accurate as the sources available at the time of update(s)

## 2022-10-12 NOTE — PLAN OF CARE
Pt here with seizures. A&O X4. Neuros intact. VSS. Regular diet, thin liquids. Takes pills whole. Up with assist of one with gait belt. Denies pain. Pt scoring green on the Aggression Stop Light Tool. EEG ordered routine 1 hour long. Discharge pending.

## 2022-10-12 NOTE — ED NOTES
Bed: ED06  Expected date:   Expected time:   Means of arrival:   Comments:  Construction working in room

## 2022-10-12 NOTE — H&P
[facility]    History and Physical  Hospitalist     Date of Admission:  10/12/2022    Assessment & Plan   Sissy Stewart is a 26 year old male with a past medical history of epilepsy presents to the hospital with seizures.    Seizure  Epilepsy  One seizure prior to arrival and one in er. Ct head wnl. Received topomax 25 once and then loaded with keppra 1,500mg. Given that the patient's last neurology note states that he had break through seizures while on keppra I will not continue it. The patient reports that he has not been taking his topomax, but it appears to be the last drug he was on for his epilepsy therefore I will restart it at 25 bid until neurology can see him.   -f/u neurology consult  -f/u eeg  -c/w  Topamax 25bid  -f/u ua and urine tox  -admit to neuro floor.   -neuro checks q4  -ativan prn for seizures.    Code Status   Full Code  DVT ppx: SCDs  Expected length of stay greater than 2 days    Primary Care Physician   Physician No Ref-Primary    Chief Complaint   Seizures    History obtained from the medical chart    History of Present Illness   Sissy Stewart is a 26 year old male with a past medical history of epilepsy presents to the hospital with seizures.  The patient initially presented to emergency room after his family called EMS because he became unresponsive and had jerking movements lasting approximately 1 minute.  The patient has a history of epilepsy but has not been taking his Topamax recently.  His last seizure was approximately 2 years ago.  In the emergency room the patient was given another dose of Topamax and was to be discharged when he had another seizure lasting approximately 2 minutes.  The patient was loaded with Keppra and admitted for further management.  When I evaluated the patient he was postictal and did not provide any significant history.  On review of the patient's chart last neurology note is from [facility] on October 22,  2020, according to the note the patient was on Keppra 1500 mg twice daily however he continued to have breakthrough seizures so he was tapered off of Keppra and started on Topamax.  Unfortunately the patient was experiencing dizziness with Topamax therefore his neurologist did not want increase the dose above 100 mg twice daily.  Due to issues with tolerating his antiepileptic medications his neurologist recommended today follow-up at the [facility] epilepsy clinic.  There are no further neurology notes in the chart after this.    Past Medical History    I have reviewed this patient's medical history and updated it with pertinent information if needed.   Past Medical History:   Diagnosis Date     Epilepsia (H)        Past Surgical History   I have reviewed this patient's surgical history and updated it with pertinent information if needed.  Unable to assess, patient is postictal    Prior to Admission Medications   Prior to Admission Medications   Prescriptions Last Dose Informant Patient Reported? Taking?   topiramate (TOPAMAX) 50 MG tablet   No No   Sig: Take 3 tablets (150 mg) by mouth 2 times daily      Facility-Administered Medications: None     Allergies   No Known Allergies    Social History   I have reviewed this patient's social history and updated it with pertinent information if needed. Jameelyessy Haile Stewart  reports that he has been smoking. He does not have any smokeless tobacco history on file. He reports current drug use. Drug: Marijuana. He reports that he does not drink alcohol.    Family History   I have reviewed this patient's family history and updated it with pertinent information if needed.   Unable to assess, patient is postictal    Review of Systems   Unable to assess, patient is postictal    Physical Exam   Temp: 99.2  F (37.3  C) Temp src: Oral BP: 117/50 Pulse: 95   Resp: 21 SpO2: 95 %      Vital Signs with Ranges  Temp:  [99.2  F (37.3  C)] 99.2  F (37.3  C)  Pulse:  [] 95  Resp:  [15-21]  21  BP: (117-149)/(50-90) 117/50  SpO2:  [95 %-100 %] 95 %  0 lbs 0 oz  Physical Exam  Vitals reviewed.   Constitutional:       Appearance: Normal appearance.      Comments: Patient seen sleeping in a stretcher in the emergency room in no apparent distress.  He is arousable to verbal stimuli but falls back asleep quickly.   HENT:      Head: Normocephalic and atraumatic.      Mouth/Throat:      Mouth: Mucous membranes are moist.      Pharynx: Oropharynx is clear.   Eyes:      Extraocular Movements: Extraocular movements intact.      Conjunctiva/sclera: Conjunctivae normal.      Pupils: Pupils are equal, round, and reactive to light.   Cardiovascular:      Rate and Rhythm: Normal rate and regular rhythm.      Pulses: Normal pulses.      Heart sounds: Normal heart sounds. No murmur heard.  Pulmonary:      Effort: Pulmonary effort is normal.      Breath sounds: Normal breath sounds. No wheezing, rhonchi or rales.   Abdominal:      General: Abdomen is flat. Bowel sounds are normal.      Palpations: Abdomen is soft. There is no mass.      Tenderness: There is no abdominal tenderness. There is no guarding.   Musculoskeletal:         General: No swelling. Normal range of motion.      Cervical back: Normal range of motion and neck supple.   Skin:     General: Skin is warm and dry.   Neurological:      General: No focal deficit present.      Mental Status: Mental status is at baseline.      Cranial Nerves: No cranial nerve deficit.      Comments: Patient is moving all 4 extremities spontaneously.  Follows basic commands such as squeezing fingers and opening his eyes.  Patient is oriented to place and knew that he was in [facility].  He did not answer me when I asked him what the date was.

## 2022-10-12 NOTE — PROGRESS NOTES
RECEIVING UNIT ED HANDOFF REVIEW    ED Nurse Handoff Report was reviewed by: Charlotte Chapa RN on October 12, 2022 at 8:03 AM

## 2022-10-12 NOTE — ED NOTES
Writer heard patient calling out. At bedside patient was saying help me, help me! He was tossing all over and suddenly went ridged. He turned his head towards the right and lifted his right leg up and made repeated jerking movement. He crossed his right arm over his body. He started to drool. Patient was suctioned and airway maintained. These movements lasted around 1 min. Patient became postictal and was given Ativan. EPPA was updated of seizure. Patient continues to be postictal and continues to be monitored.

## 2022-10-12 NOTE — LETTER
[place] [address] [place] [address]  [phone]    2022    Re: Sissy Stewart  [address] [place] [address]  [phone] (home)     : 1995      To Whom It May Concern:      Sissy Stewart was hospitalized from 10/12/2022 until 10/13/2022 due to medical illness.  He may return to work when he feels up to it.  He should not drive or operate heavy machinery until he is cleared by his outpatient doctors.      Sincerely,            Perez Artis MD

## 2022-10-12 NOTE — PROGRESS NOTES
[facility]    Medicine Progress Note - Hospitalist Service    Date of Admission:  10/12/2022    Assessment & Plan        Sissy Stewart is a 26 year old male with a past medical history of epilepsy presents to the hospital with seizures.    Seizure  Epilepsy  One seizure prior to arrival and one in er. Ct head wnl. Received topomax 25 once and then loaded with keppra 1,500mg. Given that the patient's last neurology note states that he had break through seizures while on keppra I will not continue it. The patient reports that he has not been taking his topomax, but it appears to be the last drug he was on for his epilepsy therefore I will restart it at 25 bid until neurology can see him.    Admit to inpatient.    Neurology consulted, appreciate their assistance.    EEG ordered.    Continue Topamax for now pending Neurology evaluation/recommendations.    Seizure precautions.    PRN lorazepam available for seizures.    Neuro checks every 4 hours.    UA and Utox ordered, but not yet collected.       Diet: Regular Diet Adult    DVT Prophylaxis: Pneumatic Compression Devices  Hdz Catheter: Not present  Central Lines: None  Cardiac Monitoring: None  Code Status: Full Code      Disposition Plan      Expected Discharge Date: 10/13/2022                The patient's care was discussed with the Bedside Nurse and Patient.    Perez Artis MD  Hospitalist Service  [facility]  Securely message with the Vocera Web Console (learn more here)  Text page via [employer] Paging/Directory         Clinically Significant Risk Factors Present on Admission                          ______________________________________________________________________    Interval History   Sissy Stewart was seen this morning. He feels OK at the moment. Denies headache, fevers, chest pain, shortness of breath, nausea, abdominal pain. A little slow to respond at times.    Data reviewed today: I  reviewed all medications, new labs and imaging results over the last 24 hours. I personally reviewed no images or EKG's today.    Physical Exam   Vital Signs: Temp: 98.3  F (36.8  C) Temp src: Oral BP: 118/63 Pulse: 62   Resp: 16 SpO2: 99 % O2 Device: None (Room air)    Weight: 0 lbs 0 oz  Constitutional: awake, alert, cooperative, no apparent distress, laying in the hospital bed  Respiratory: clear to auscultation bilaterally, no crackles or wheezing  Cardiovascular: regular rate and rhythm, normal S1 and S2, no murmur noted  GI: normal bowel sounds, soft, non-distended, non-tender  Skin: warm, dry  Musculoskeletal: no lower extremity pitting edema present  Neurologic: awake, alert, answers questions appropriately but a little slow to respond at times, moves all extremities    Data   Recent Labs   Lab 10/12/22  0053   WBC 7.7   HGB 15.0   MCV 87         POTASSIUM 3.8   CHLORIDE 109   CO2 21   BUN 11   CR 0.68   ANIONGAP 8   MARCELLO 9.2   *     Recent Results (from the past 24 hour(s))   CT Head w/o Contrast    Narrative    EXAM: CT HEAD W/O CONTRAST  LOCATION: [facility]  DATE/TIME: 10/12/2022 3:15 AM    INDICATION: recurrent seizures  COMPARISON: None.  TECHNIQUE: Routine CT Head without IV contrast. Multiplanar reformats. Dose reduction techniques were used.    FINDINGS:  INTRACRANIAL CONTENTS: Small arachnoid cyst along the left parietal convexity. No acute intracranial hemorrhage or adverse intracranial mass effect. No CT evidence of acute infarct. Normal parenchymal attenuation. Normal ventricles and sulci.     VISUALIZED ORBITS/SINUSES/MASTOIDS: No intraorbital abnormality. Mucosal thickening primarily involving the ethmoid air cells. No middle ear or mastoid effusion.    BONES/SOFT TISSUES: No acute abnormality.      Impression    IMPRESSION:  1.  No acute intracranial process.     Medications     sodium chloride 100 mL/hr at 10/12/22 1005       sodium chloride (PF)   3 mL Intracatheter Q8H     topiramate  25 mg Oral Q12H [facility] (08/20)

## 2022-10-12 NOTE — ED NOTES
Bed: ED03  Expected date: 10/12/22  Expected time: 12:32 AM  Means of arrival: Ambulance  Comments:   26M seizure

## 2022-10-13 VITALS
TEMPERATURE: 99 F | SYSTOLIC BLOOD PRESSURE: 122 MMHG | HEART RATE: 68 BPM | DIASTOLIC BLOOD PRESSURE: 61 MMHG | OXYGEN SATURATION: 99 % | RESPIRATION RATE: 16 BRPM

## 2022-10-13 LAB
ALBUMIN SERPL-MCNC: 3.4 G/DL (ref 3.4–5)
ALP SERPL-CCNC: 79 U/L (ref 40–150)
ALT SERPL W P-5'-P-CCNC: 43 U/L (ref 0–70)
ANION GAP SERPL CALCULATED.3IONS-SCNC: 8 MMOL/L (ref 3–14)
AST SERPL W P-5'-P-CCNC: 32 U/L (ref 0–45)
BILIRUB SERPL-MCNC: 0.3 MG/DL (ref 0.2–1.3)
BUN SERPL-MCNC: 10 MG/DL (ref 7–30)
CALCIUM SERPL-MCNC: 8.3 MG/DL (ref 8.5–10.1)
CHLORIDE BLD-SCNC: 114 MMOL/L (ref 94–109)
CO2 SERPL-SCNC: 18 MMOL/L (ref 20–32)
CREAT SERPL-MCNC: 0.76 MG/DL (ref 0.66–1.25)
ERYTHROCYTE [DISTWIDTH] IN BLOOD BY AUTOMATED COUNT: 14 % (ref 10–15)
GFR SERPL CREATININE-BSD FRML MDRD: >90 ML/MIN/1.73M2
GLUCOSE BLD-MCNC: 106 MG/DL (ref 70–99)
HCT VFR BLD AUTO: 44.7 % (ref 40–53)
HGB BLD-MCNC: 14.7 G/DL (ref 13.3–17.7)
MCH RBC QN AUTO: 29.1 PG (ref 26.5–33)
MCHC RBC AUTO-ENTMCNC: 32.9 G/DL (ref 31.5–36.5)
MCV RBC AUTO: 88 FL (ref 78–100)
PLATELET # BLD AUTO: 319 10E3/UL (ref 150–450)
POTASSIUM BLD-SCNC: 3.7 MMOL/L (ref 3.4–5.3)
PROT SERPL-MCNC: 6.6 G/DL (ref 6.8–8.8)
RBC # BLD AUTO: 5.06 10E6/UL (ref 4.4–5.9)
SODIUM SERPL-SCNC: 140 MMOL/L (ref 133–144)
TOPIRAMATE SERPL-MCNC: <1.5 UG/ML
WBC # BLD AUTO: 7 10E3/UL (ref 4–11)

## 2022-10-13 PROCEDURE — 99217 PR OBSERVATION CARE DISCHARGE: CPT | Performed by: HOSPITALIST

## 2022-10-13 PROCEDURE — 36415 COLL VENOUS BLD VENIPUNCTURE: CPT | Performed by: HOSPITALIST

## 2022-10-13 PROCEDURE — G0378 HOSPITAL OBSERVATION PER HR: HCPCS

## 2022-10-13 PROCEDURE — 80053 COMPREHEN METABOLIC PANEL: CPT | Performed by: HOSPITALIST

## 2022-10-13 PROCEDURE — 85027 COMPLETE CBC AUTOMATED: CPT | Performed by: HOSPITALIST

## 2022-10-13 PROCEDURE — 250N000013 HC RX MED GY IP 250 OP 250 PS 637: Performed by: HOSPITALIST

## 2022-10-13 PROCEDURE — 258N000003 HC RX IP 258 OP 636: Performed by: HOSPITALIST

## 2022-10-13 RX ORDER — NALOXONE HYDROCHLORIDE 0.4 MG/ML
0.2 INJECTION, SOLUTION INTRAMUSCULAR; INTRAVENOUS; SUBCUTANEOUS
Status: DISCONTINUED | OUTPATIENT
Start: 2022-10-13 | End: 2022-10-13 | Stop reason: HOSPADM

## 2022-10-13 RX ORDER — NALOXONE HYDROCHLORIDE 0.4 MG/ML
0.4 INJECTION, SOLUTION INTRAMUSCULAR; INTRAVENOUS; SUBCUTANEOUS
Status: DISCONTINUED | OUTPATIENT
Start: 2022-10-13 | End: 2022-10-13 | Stop reason: HOSPADM

## 2022-10-13 RX ORDER — LACOSAMIDE 100 MG/1
100 TABLET ORAL 2 TIMES DAILY
Qty: 60 TABLET | Refills: 0 | Status: SHIPPED | OUTPATIENT
Start: 2022-10-13

## 2022-10-13 RX ORDER — LACOSAMIDE 100 MG/1
100 TABLET ORAL 2 TIMES DAILY
Status: DISCONTINUED | OUTPATIENT
Start: 2022-10-13 | End: 2022-10-13 | Stop reason: HOSPADM

## 2022-10-13 RX ADMIN — SODIUM CHLORIDE, PRESERVATIVE FREE: 5 INJECTION INTRAVENOUS at 06:06

## 2022-10-13 RX ADMIN — LACOSAMIDE 100 MG: 100 TABLET, FILM COATED ORAL at 11:51

## 2022-10-13 RX ADMIN — TOPIRAMATE 25 MG: 25 TABLET, FILM COATED ORAL at 08:22

## 2022-10-13 ASSESSMENT — ACTIVITIES OF DAILY LIVING (ADL)
ADLS_ACUITY_SCORE: 18

## 2022-10-13 NOTE — CONSULTS
Patient has [facility] through [facility].     Lacosamide 100mg: $7/mo.     Cassidy Smith  Pharmacy Technician/Liaison, Discharge Pharmacy   [phone]  [email or web address]

## 2022-10-13 NOTE — DISCHARGE SUMMARY
[facility]  Hospitalist Discharge Summary      Date of Admission:  10/12/2022  Date of Discharge:  10/13/2022  Discharging Provider: Perez Artis MD  Discharge Service: Hospitalist Service    Discharge Diagnoses   Seizure  Epilepsy    Follow-ups Needed After Discharge   Follow-up Appointments     Follow-up and recommended labs and tests       Follow up with primary care provider within 7 days for hospital follow-   up.  No follow up labs or test are needed.  Follow up with Dr. Renteria at [facility] within 2   weeks for seizure follow up. Call [phone] to schedule appointment.             Unresulted Labs Ordered in the Past 30 Days of this Admission     Date and Time Order Name Status Description    10/12/2022 12:39 AM Topiramate Level In process       These results will be followed up by Neurology.    Discharge Disposition   Discharged to home  Condition at discharge: Stable    Hospital Course   Sissy Stewart is a 26 year old male with a past medical history of epilepsy presents to the hospital with seizures.    Seizure  Epilepsy  One seizure prior to arrival and one in er. Ct head wnl. Received topomax 25 once and then loaded with keppra 1,500mg. Given that the patient's last neurology note states that he had break through seizures while on keppra I will not continue it. The patient reports that he has not been taking his topomax, but it appears to be the last drug he was on for his epilepsy therefore I will restart it at 25 bid until neurology can see him.    Admit to inpatient.    Neurology consulted, appreciate their assistance.    Seizure precautions.    EEG was normal.    UA unremarkable.    Utox negative.    Continued topamax initially then switched to vimpat per Neurology.    No further seizures since admission.    He feels good today and feels ready for discharge home.    Discharge home today.    Follow up with PCP and Neurology within two  weeks.    Discussed reasons to seek medical attention prior to follow up appointments.    No driving until cleared by outpatient Neurologist. Discussed with patient, he is aware of this recommendation.    Consultations This Hospital Stay   NEUROLOGY IP CONSULT  PHARMACY IP CONSULT  PHARMACY LIAISON FOR MEDICATION COVERAGE CONSULT    Code Status   Full Code    Time Spent on this Encounter   I, Perez Artis MD, personally saw the patient today and spent less than or equal to 30 minutes discharging this patient.       Perez Artis MD  [facility]  [address] [place] [address]  Phone: [phone]  ______________________________________________________________________    Physical Exam   Vital Signs: Temp: 98.4  F (36.9  C) Temp src: Oral BP: 111/57 Pulse: 59   Resp: 17 SpO2: 98 % O2 Device: None (Room air)    Weight: 0 lbs 0 oz  Constitutional: awake, alert, cooperative, no apparent distress, laying in the hospital bed  Respiratory: clear to auscultation bilaterally, no crackles or wheezing  Cardiovascular: regular rate and rhythm, normal S1 and S2, no murmur noted  GI: normal bowel sounds, soft, non-distended, non-tender  Skin: warm, dry  Musculoskeletal: no lower extremity pitting edema present  Neurologic: awake, alert, answers questions appropriately, moves all extremities       Primary Care Physician   Physician No Ref-Primary    Discharge Orders      Reason for your hospital stay    Seizures     Follow-up and recommended labs and tests     Follow up with primary care provider within 7 days for hospital follow- up.  No follow up labs or test are needed.  Follow up with Dr. Renteria at [facility] within 2 weeks for seizure follow up. Call [phone] to schedule appointment.     Activity    Your activity upon discharge: activity as tolerated. No driving or operating heavy machinery until cleared by outpatient neurologist.     Diet    Follow  this diet upon discharge: Regular Diet Adult     Significant Results and Procedures   Most Recent 3 CBC's:Recent Labs   Lab Test 10/13/22  0839 10/12/22  0053 10/07/20  0748   WBC 7.0 7.7 7.6   HGB 14.7 15.0 15.7   MCV 88 87 88    320 317     Most Recent 3 BMP's:Recent Labs   Lab Test 10/13/22  0839 10/12/22  0053 10/07/20  0748    138 140   POTASSIUM 3.7 3.8 3.6   CHLORIDE 114* 109 113*   CO2 18* 21 20   BUN 10 11 12   CR 0.76 0.68 0.95   ANIONGAP 8 8 7   MARCELLO 8.3* 9.2 8.9   * 110* 85     Results for orders placed or performed during the hospital encounter of 10/12/22   CT Head w/o Contrast    Narrative    EXAM: CT HEAD W/O CONTRAST  LOCATION: [facility]  DATE/TIME: 10/12/2022 3:15 AM    INDICATION: recurrent seizures  COMPARISON: None.  TECHNIQUE: Routine CT Head without IV contrast. Multiplanar reformats. Dose reduction techniques were used.    FINDINGS:  INTRACRANIAL CONTENTS: Small arachnoid cyst along the left parietal convexity. No acute intracranial hemorrhage or adverse intracranial mass effect. No CT evidence of acute infarct. Normal parenchymal attenuation. Normal ventricles and sulci.     VISUALIZED ORBITS/SINUSES/MASTOIDS: No intraorbital abnormality. Mucosal thickening primarily involving the ethmoid air cells. No middle ear or mastoid effusion.    BONES/SOFT TISSUES: No acute abnormality.      Impression    IMPRESSION:  1.  No acute intracranial process.     Discharge Medications   Current Discharge Medication List      START taking these medications    Details   Lacosamide (VIMPAT) 100 MG TABS tablet Take 1 tablet (100 mg) by mouth 2 times daily  Qty: 60 tablet, Refills: 0    Associated Diagnoses: Recurrent seizures (H)           Allergies   No Known Allergies

## 2022-10-13 NOTE — PLAN OF CARE
October 12, 2022  Shift:[id number]  Sissy Stewart  26 year old  YOB: 1995    Reason for admission:Recurrent seizures (H) [G40.909]    Cognition/Mentation:A&Ox4  Neuros/CMS:WNL  VS:VSS on RA  Cardiac:WNL  GI:WNL  :WNL  Pulmonary:WNL  Pain:denies pain  Drains/Lines:PIV infusing NS @100 ml/hr  Skin:WNL   Activity:A1 with GB  Diet:regular with thin liquids  Discharge:pending

## 2022-10-13 NOTE — UTILIZATION REVIEW
"  Admission Status; Secondary Review Determination         Under the authority of the Utilization Management Committee, the utilization review process indicated a secondary review on the above patient.  The review outcome is based on review of the medical records, discussions with staff, and applying clinical experience noted on the date of the review.          (x) Observation Status Appropriate - This patient does not meet hospital inpatient criteria and is placed in observation status. If this patient's primary payer is Medicare and was admitted as an inpatient, Condition Code 44 should be used and patient status changed to \"observation\".     RATIONALE FOR DETERMINATION   26 year old male with a past medical history of epilepsy presents to the hospital with seizures.  Per documentation patient seizure is related to noncompliance, his medication was started and neurology was consulted.  No evidence of status epilepticus, acute other neurological or systemic finding lowering seizures threshold.  The severity of illness, intensity of service provided, expected LOS and risk for adverse outcome make the care appropriate for further observation; however, doesn't meet criteria for hospital inpatient admission. Dr Artis of notified of this determination.    This document was produced using voice recognition software.      The information on this document is developed by the utilization review team in order for the business office to ensure compliance.  This only denotes the appropriateness of proper admission status and does not reflect the quality of care rendered.         The definitions of Inpatient Status and Observation Status used in making the determination above are those provided in the CMS Coverage Manual, Chapter 1 and Chapter 6, section 70.4.      Sincerely,     THERESA CHERY MD    System Medical Director  Utilization Management  [facility].      "

## 2022-10-13 NOTE — PLAN OF CARE
VSS. Pt here with recurrent seizures. No seizure like activity this shift, neuros intact. SBA to the bathroom. A/Ox4, able to express needs. No complaints of pain or discomfort. IVF per orders. Will continue to monitor.    /74   Pulse 61   Temp 98.3  F (36.8  C) (Oral)   Resp 16   SpO2 95%

## 2022-10-13 NOTE — PROGRESS NOTES
piv removed.  Discharge instructions reviewed with pt and pt family member.  All questions answered re new meds, activity restrictions and f/u care w/ neuro.  Pt has all belongings.  Work note given to patient.  Pt to discharge when meds arrive.